# Patient Record
Sex: MALE | Race: WHITE | URBAN - METROPOLITAN AREA
[De-identification: names, ages, dates, MRNs, and addresses within clinical notes are randomized per-mention and may not be internally consistent; named-entity substitution may affect disease eponyms.]

---

## 2018-12-20 LAB
ALBUMIN SERPL-MCNC: 4.2 G/DL
ALP BLD-CCNC: 65 U/L
ALT SERPL-CCNC: 41 U/L
ANION GAP SERPL CALCULATED.3IONS-SCNC: 7 MMOL/L
AST SERPL-CCNC: 22 U/L
AVERAGE GLUCOSE: NORMAL
BASOPHILS ABSOLUTE: 69 /ΜL
BASOPHILS RELATIVE PERCENT: 1.3 %
BILIRUB SERPL-MCNC: 0.9 MG/DL (ref 0.1–1.4)
BUN BLDV-MCNC: 8 MG/DL
CALCIUM SERPL-MCNC: 9.7 MG/DL
CHLORIDE BLD-SCNC: 102 MMOL/L
CHOLESTEROL, TOTAL: 190 MG/DL
CHOLESTEROL/HDL RATIO: NORMAL
CO2: 30 MMOL/L
CREAT SERPL-MCNC: 0.94 MG/DL
EOSINOPHILS ABSOLUTE: 201 /ΜL
EOSINOPHILS RELATIVE PERCENT: 3.8 %
GFR CALCULATED: NORMAL
GLUCOSE BLD-MCNC: 171 MG/DL
HBA1C MFR BLD: 5.5 %
HCT VFR BLD CALC: 44.6 % (ref 41–53)
HDLC SERPL-MCNC: 47 MG/DL (ref 35–70)
HEMOGLOBIN: 15 G/DL (ref 13.5–17.5)
LDL CHOLESTEROL CALCULATED: 105 MG/DL (ref 0–160)
LYMPHOCYTES ABSOLUTE: 1627 /ΜL
LYMPHOCYTES RELATIVE PERCENT: 30.7 %
MCH RBC QN AUTO: 29 PG
MCHC RBC AUTO-ENTMCNC: 33.7 G/DL
MCV RBC AUTO: 85.9 FL
MONOCYTES ABSOLUTE: 276 /ΜL
MONOCYTES RELATIVE PERCENT: 5.2 %
NEUTROPHILS ABSOLUTE: 3127 /ΜL
NEUTROPHILS RELATIVE PERCENT: 59 %
PLATELET # BLD: 158 K/ΜL
PMV BLD AUTO: 9.6 FL
POTASSIUM SERPL-SCNC: 4 MMOL/L
RBC # BLD: 5.19 10^6/ΜL
SODIUM BLD-SCNC: 139 MMOL/L
T4 FREE: 0.76
TOTAL PROTEIN: 6.4
TRIGL SERPL-MCNC: 192 MG/DL
TSH SERPL DL<=0.05 MIU/L-ACNC: 1.54 UIU/ML
VLDLC SERPL CALC-MCNC: NORMAL MG/DL
WBC # BLD: 5.3 10^3/ML

## 2019-06-27 ENCOUNTER — OFFICE VISIT (OUTPATIENT)
Dept: INTERNAL MEDICINE CLINIC | Age: 54
End: 2019-06-27
Payer: MEDICAID

## 2019-06-27 VITALS
OXYGEN SATURATION: 98 % | BODY MASS INDEX: 36.73 KG/M2 | HEIGHT: 71 IN | SYSTOLIC BLOOD PRESSURE: 122 MMHG | WEIGHT: 262.4 LBS | RESPIRATION RATE: 16 BRPM | HEART RATE: 99 BPM | DIASTOLIC BLOOD PRESSURE: 84 MMHG

## 2019-06-27 DIAGNOSIS — Z12.5 PROSTATE CANCER SCREENING: ICD-10-CM

## 2019-06-27 DIAGNOSIS — K21.9 GASTROESOPHAGEAL REFLUX DISEASE WITHOUT ESOPHAGITIS: ICD-10-CM

## 2019-06-27 DIAGNOSIS — Z12.11 COLON CANCER SCREENING: ICD-10-CM

## 2019-06-27 DIAGNOSIS — J30.89 ALLERGIC RHINITIS DUE TO OTHER ALLERGIC TRIGGER, UNSPECIFIED SEASONALITY: ICD-10-CM

## 2019-06-27 DIAGNOSIS — Z23 NEED FOR PNEUMOCOCCAL VACCINATION: ICD-10-CM

## 2019-06-27 DIAGNOSIS — Z11.59 NEED FOR HEPATITIS C SCREENING TEST: ICD-10-CM

## 2019-06-27 DIAGNOSIS — F33.41 RECURRENT MAJOR DEPRESSIVE DISORDER, IN PARTIAL REMISSION (HCC): ICD-10-CM

## 2019-06-27 DIAGNOSIS — Z11.4 ENCOUNTER FOR SCREENING FOR HIV: ICD-10-CM

## 2019-06-27 DIAGNOSIS — B35.4 TINEA CORPORIS: ICD-10-CM

## 2019-06-27 DIAGNOSIS — F10.21 ALCOHOLISM IN REMISSION (HCC): ICD-10-CM

## 2019-06-27 DIAGNOSIS — F41.9 ANXIETY: Primary | ICD-10-CM

## 2019-06-27 PROBLEM — J30.2 SEASONAL ALLERGIES: Status: ACTIVE | Noted: 2019-01-08

## 2019-06-27 PROBLEM — F32.9 MAJOR DEPRESSIVE DISORDER: Status: ACTIVE | Noted: 2018-12-18

## 2019-06-27 PROBLEM — F43.10 POSTTRAUMATIC STRESS DISORDER: Status: ACTIVE | Noted: 2018-12-18

## 2019-06-27 PROCEDURE — 1036F TOBACCO NON-USER: CPT | Performed by: INTERNAL MEDICINE

## 2019-06-27 PROCEDURE — 3017F COLORECTAL CA SCREEN DOC REV: CPT | Performed by: INTERNAL MEDICINE

## 2019-06-27 PROCEDURE — 90732 PPSV23 VACC 2 YRS+ SUBQ/IM: CPT | Performed by: INTERNAL MEDICINE

## 2019-06-27 PROCEDURE — 99204 OFFICE O/P NEW MOD 45 MIN: CPT | Performed by: INTERNAL MEDICINE

## 2019-06-27 PROCEDURE — G8417 CALC BMI ABV UP PARAM F/U: HCPCS | Performed by: INTERNAL MEDICINE

## 2019-06-27 PROCEDURE — G8427 DOCREV CUR MEDS BY ELIG CLIN: HCPCS | Performed by: INTERNAL MEDICINE

## 2019-06-27 PROCEDURE — 90471 IMMUNIZATION ADMIN: CPT | Performed by: INTERNAL MEDICINE

## 2019-06-27 RX ORDER — FLUTICASONE PROPIONATE 50 MCG
2 SPRAY, SUSPENSION (ML) NASAL DAILY
Qty: 1 BOTTLE | Refills: 5 | Status: SHIPPED | OUTPATIENT
Start: 2019-06-27 | End: 2021-02-23

## 2019-06-27 RX ORDER — QUETIAPINE FUMARATE 100 MG/1
TABLET, FILM COATED ORAL
Refills: 2 | COMMUNITY
Start: 2019-06-19 | End: 2019-10-31 | Stop reason: SDUPTHER

## 2019-06-27 RX ORDER — OMEPRAZOLE 20 MG/1
CAPSULE, DELAYED RELEASE ORAL
Refills: 0 | COMMUNITY
Start: 2019-05-16 | End: 2019-06-27 | Stop reason: SDUPTHER

## 2019-06-27 RX ORDER — PRAZOSIN HYDROCHLORIDE 1 MG/1
CAPSULE ORAL
Refills: 2 | COMMUNITY
Start: 2019-06-19 | End: 2019-10-31 | Stop reason: SDUPTHER

## 2019-06-27 RX ORDER — OMEPRAZOLE 20 MG/1
20 CAPSULE, DELAYED RELEASE ORAL 2 TIMES DAILY
Qty: 60 CAPSULE | Refills: 5 | Status: SHIPPED | OUTPATIENT
Start: 2019-06-27 | End: 2019-10-31 | Stop reason: SDUPTHER

## 2019-06-27 RX ORDER — LEVOCETIRIZINE DIHYDROCHLORIDE 5 MG/1
5 TABLET, FILM COATED ORAL NIGHTLY
Qty: 30 TABLET | Refills: 5 | Status: SHIPPED | OUTPATIENT
Start: 2019-06-27 | End: 2021-02-23

## 2019-06-27 SDOH — HEALTH STABILITY: MENTAL HEALTH: HOW OFTEN DO YOU HAVE A DRINK CONTAINING ALCOHOL?: NEVER

## 2019-06-27 ASSESSMENT — PATIENT HEALTH QUESTIONNAIRE - PHQ9
SUM OF ALL RESPONSES TO PHQ QUESTIONS 1-9: 2
1. LITTLE INTEREST OR PLEASURE IN DOING THINGS: 1
SUM OF ALL RESPONSES TO PHQ9 QUESTIONS 1 & 2: 2
SUM OF ALL RESPONSES TO PHQ QUESTIONS 1-9: 2
2. FEELING DOWN, DEPRESSED OR HOPELESS: 1

## 2019-06-27 ASSESSMENT — ENCOUNTER SYMPTOMS
ABDOMINAL PAIN: 0
EYE PAIN: 0
WHEEZING: 0
BACK PAIN: 0
COLOR CHANGE: 0
CONSTIPATION: 0
VOMITING: 0
SINUS PAIN: 0
SINUS PRESSURE: 0
DIARRHEA: 0
COUGH: 0
NAUSEA: 0
PHOTOPHOBIA: 0
RHINORRHEA: 1
SHORTNESS OF BREATH: 0
TROUBLE SWALLOWING: 0

## 2019-06-27 NOTE — PROGRESS NOTES
2019    Marquis Dulce Cohen (:  1965) is a 52 y.o. male, here for evaluation of the following medical concerns:    HPI     Has depression, anxiety and PTSD  Wants a psychiatrist and a psychologist  Currently being seen at [facility] but wants to move on from there  On sertraline 150mg daily, seroquel 100mg HS and prazosin 1mg hs  Taking metformin - Dr. Danica Jimenez - to counteract effects of seroquel. These medications are doing okay. Still with significant anxiety and some agoraphobia. He is seeing a  for counseling. He is in recovery from alcoholism. No EtOH in last 6 years. GERD - omeprazole - well controlled on 20mg BID    Itchy rash on elbow for last few months. Has been there in the past and gone away. Now back. Takes generic claritin 2 x a day. This helps some. Eual Lack does nothing - not helpful at all  Has used zyrtec as well  Still with nasal allergy symptoms and itchy watery eyes. Has used nasal sprays in the past  Eye discharge    Thirsty all the time - last DM screen was normal. Sodium is normal also. Dad has both knees and hips replaced. He has had a left TKR. No other joints bother him. Review of Systems   Constitutional: Negative for appetite change, fatigue, fever and unexpected weight change. No night sweats   HENT: Positive for postnasal drip and rhinorrhea. Negative for congestion, ear pain, hearing loss, nosebleeds, sinus pressure, sinus pain, sneezing, tinnitus and trouble swallowing. Eyes: Negative for photophobia, pain and visual disturbance. Respiratory: Negative for cough, shortness of breath and wheezing. Cardiovascular: Negative for chest pain, palpitations and leg swelling. Gastrointestinal: Negative for abdominal pain, constipation, diarrhea, nausea and vomiting. Endocrine: Negative for cold intolerance, heat intolerance, polydipsia, polyphagia and polyuria.         +dry mouth   Genitourinary: Negative for difficulty urinating, dysuria, frequency, hematuria and urgency. Musculoskeletal: Positive for arthralgias. Negative for back pain, joint swelling, myalgias and neck pain. Skin: Negative for color change and rash. Allergic/Immunologic: Negative for environmental allergies, food allergies and immunocompromised state. Neurological: Negative for dizziness, syncope, speech difficulty, weakness, light-headedness, numbness and headaches. Hematological: Negative for adenopathy. Does not bruise/bleed easily. Psychiatric/Behavioral: Negative for dysphoric mood and sleep disturbance. The patient is nervous/anxious. Prior to Visit Medications    Medication Sig Taking?  Authorizing Provider   QUEtiapine (SEROQUEL) 100 MG tablet 1 tablet nightly Yes Historical Provider, MD   prazosin (MINIPRESS) 1 MG capsule one tablet daily Yes Historical Provider, MD   metFORMIN (GLUCOPHAGE) 500 MG tablet 1 tablet daily Yes Historical Provider, MD   sertraline (ZOLOFT) 50 MG tablet TK 1 AND 1/2 TS PO D Yes Historical Provider, MD   omeprazole (PRILOSEC) 20 MG delayed release capsule BID Yes Historical Provider, MD   Loratadine (CLARITIN PO) Take by mouth Yes Historical Provider, MD        No Known Allergies    Past Medical History:   Diagnosis Date    Anxiety     Arthritis     Depression        Past Surgical History:   Procedure Laterality Date    TONSILLECTOMY  1983    TOTAL KNEE ARTHROPLASTY Left        Social History     Socioeconomic History    Marital status: Single     Spouse name: Not on file    Number of children: Not on file    Years of education: Not on file    Highest education level: Not on file   Occupational History    Not on file   Social Needs    Financial resource strain: Not on file    Food insecurity:     Worry: Not on file     Inability: Not on file    Transportation needs:     Medical: Not on file     Non-medical: Not on file   Tobacco Use    Smoking status: Never Smoker    Smokeless tobacco: Never Neck supple. No JVD present. No thyromegaly present. No carotid bruits   Cardiovascular: Normal rate, regular rhythm, normal heart sounds and intact distal pulses. Exam reveals no gallop and no friction rub. No murmur heard. Pulmonary/Chest: Effort normal and breath sounds normal. He has no wheezes. He has no rales. Abdominal: Soft. Bowel sounds are normal.   Musculoskeletal: Normal range of motion. He exhibits no edema, tenderness or deformity. Lymphadenopathy:     He has no cervical adenopathy. Neurological: He is alert and oriented to person, place, and time. Coordination normal.   Skin: Skin is warm and dry. No rash noted. He is not diaphoretic. Circular lesion with heaped up edges and central clearing with some mild scale on right elbow. Some unroofed papules at edges   Psychiatric: He has a normal mood and affect. His behavior is normal.       ASSESSMENT/PLAN:  1. Anxiety  4. Recurrent major depressive disorder, in partial remission ([address]  10. Alcoholism in remission [facility])  Continue current meds. Given resources for community psychiatry and psychology. - Pneumococcal polysaccharide vaccine 23-valent greater than or equal to 3yo subcutaneous/IM  - Vitamin D 25 Hydroxy; Future  - B12 levels    2. Tinea corporis  Presumed on elbow. If no improvement with treatment for tinea will rx steroids for presumed annular eczema. - terbinafine (LAMISIL) 1 % cream; Apply topically 2 times daily to affected area for at least 2 weeks  Dispense: 24 g; Refill: 0    3. Gastroesophageal reflux disease without esophagitis  Symptoms well controlled on high dose PPI. He will try some ranitidine instead OTC to try to get off of medication. Continue to monitor. Consider EGD in future if control becomes worse. - Magnesium; Future  - Vitamin B12; Future  - omeprazole (PRILOSEC) 20 MG delayed release capsule; Take 1 capsule by mouth 2 times daily  Dispense: 60 capsule; Refill: 5    5.  Encounter for screening for HIV  - HIV-1 AND HIV-2 ANTIBODIES; Future    6. Prostate cancer screening  After discussion of risks and benefits of screening we have chosen to proceed with PSA screening.   - Psa screening; Future    7. Colon cancer screening  - Marty Azar MD, Gastroenterology, [facility]    8. Need for hepatitis C screening test  - Hepatitis C Antibody; Future    9. Need for pneumococcal vaccination  - Pneumococcal polysaccharide vaccine 23-valent greater than or equal to 3yo subcutaneous/IM    11. Allergic rhinitis due to other allergic trigger, unspecified seasonality  Has failed multiple other H2 blockers including high dose claritin, zyrtec and allegra. Will trial xyzal and add flonase.   - levocetirizine (XYZAL) 5 MG tablet; Take 1 tablet by mouth nightly  Dispense: 30 tablet; Refill: 5  - fluticasone (FLONASE) 50 MCG/ACT nasal spray; 2 sprays by Each Nostril route daily  Dispense: 1 Bottle; Refill: 5      Return in about 6 months (around 12/27/2019) for Physical.    An  electronic signature was used to authenticate this note.     --Miguel Diaz MD on 6/27/2019 at 8:24 AM

## 2019-06-28 RX ORDER — ERGOCALCIFEROL 1.25 MG/1
50000 CAPSULE ORAL WEEKLY
Qty: 12 CAPSULE | Refills: 0 | Status: SHIPPED | OUTPATIENT
Start: 2019-06-28 | End: 2019-09-25 | Stop reason: SDUPTHER

## 2019-09-25 RX ORDER — ERGOCALCIFEROL 1.25 MG/1
CAPSULE ORAL
Qty: 12 CAPSULE | Refills: 0 | Status: SHIPPED | OUTPATIENT
Start: 2019-09-25 | End: 2021-02-23

## 2019-10-30 ENCOUNTER — PATIENT MESSAGE (OUTPATIENT)
Dept: INTERNAL MEDICINE CLINIC | Age: 54
End: 2019-10-30

## 2019-10-30 DIAGNOSIS — K21.9 GASTROESOPHAGEAL REFLUX DISEASE WITHOUT ESOPHAGITIS: ICD-10-CM

## 2019-10-31 RX ORDER — OMEPRAZOLE 20 MG/1
20 CAPSULE, DELAYED RELEASE ORAL 2 TIMES DAILY
Qty: 60 CAPSULE | Refills: 5 | Status: SHIPPED | OUTPATIENT
Start: 2019-10-31 | End: 2020-09-01 | Stop reason: SDUPTHER

## 2019-10-31 RX ORDER — PRAZOSIN HYDROCHLORIDE 1 MG/1
3 CAPSULE ORAL NIGHTLY
Qty: 90 CAPSULE | Refills: 2 | Status: SHIPPED | OUTPATIENT
Start: 2019-10-31 | End: 2020-09-01 | Stop reason: SDUPTHER

## 2019-10-31 RX ORDER — SERTRALINE HYDROCHLORIDE 100 MG/1
150 TABLET, FILM COATED ORAL DAILY
Qty: 45 TABLET | Refills: 2 | Status: SHIPPED | OUTPATIENT
Start: 2019-10-31 | End: 2020-09-01 | Stop reason: SDUPTHER

## 2019-10-31 RX ORDER — QUETIAPINE FUMARATE 300 MG/1
300 TABLET, FILM COATED ORAL DAILY
Qty: 30 TABLET | Refills: 2 | Status: SHIPPED | OUTPATIENT
Start: 2019-10-31 | End: 2020-01-28

## 2020-01-28 RX ORDER — QUETIAPINE FUMARATE 300 MG/1
300 TABLET, FILM COATED ORAL DAILY
Qty: 30 TABLET | Refills: 0 | Status: SHIPPED | OUTPATIENT
Start: 2020-01-28 | End: 2020-07-13

## 2020-01-28 NOTE — TELEPHONE ENCOUNTER
Last appointment: 6/27/2019  Next appointment: Visit date not found  Last refill: 10/31/19      Sent [employer] message to pt to remind due for follow up

## 2020-02-03 RX ORDER — SERTRALINE HYDROCHLORIDE 100 MG/1
TABLET, FILM COATED ORAL
Qty: 45 TABLET | Refills: 2 | OUTPATIENT
Start: 2020-02-03

## 2020-02-03 RX ORDER — PRAZOSIN HYDROCHLORIDE 1 MG/1
CAPSULE ORAL
Qty: 90 CAPSULE | Refills: 2 | OUTPATIENT
Start: 2020-02-03

## 2020-07-13 RX ORDER — QUETIAPINE FUMARATE 300 MG/1
300 TABLET, FILM COATED ORAL DAILY
Qty: 15 TABLET | Refills: 0 | Status: SHIPPED | OUTPATIENT
Start: 2020-07-13 | End: 2020-08-03

## 2020-08-03 RX ORDER — QUETIAPINE FUMARATE 300 MG/1
TABLET, FILM COATED ORAL
Qty: 15 TABLET | Refills: 0 | Status: SHIPPED | OUTPATIENT
Start: 2020-08-03 | End: 2020-09-01 | Stop reason: SDUPTHER

## 2020-08-17 NOTE — TELEPHONE ENCOUNTER
Last appointment: 6/9/2020  Next appointment: Visit date not found  Last refill: 7/13/20      30 day pending and mychart message sent to pt to schedule

## 2020-09-01 ENCOUNTER — TELEPHONE (OUTPATIENT)
Dept: INTERNAL MEDICINE CLINIC | Age: 55
End: 2020-09-01

## 2020-09-01 ENCOUNTER — PATIENT MESSAGE (OUTPATIENT)
Dept: INTERNAL MEDICINE CLINIC | Age: 55
End: 2020-09-01

## 2020-09-01 RX ORDER — PRAZOSIN HYDROCHLORIDE 1 MG/1
3 CAPSULE ORAL NIGHTLY
Qty: 90 CAPSULE | Refills: 0 | Status: SHIPPED | OUTPATIENT
Start: 2020-09-01 | End: 2020-09-28

## 2020-09-01 RX ORDER — OMEPRAZOLE 20 MG/1
20 CAPSULE, DELAYED RELEASE ORAL 2 TIMES DAILY
Qty: 60 CAPSULE | Refills: 0 | Status: SHIPPED | OUTPATIENT
Start: 2020-09-01 | End: 2020-09-28

## 2020-09-01 RX ORDER — QUETIAPINE FUMARATE 300 MG/1
TABLET, FILM COATED ORAL
Qty: 30 TABLET | Refills: 0 | Status: SHIPPED | OUTPATIENT
Start: 2020-09-01 | End: 2020-09-15

## 2020-09-01 RX ORDER — SERTRALINE HYDROCHLORIDE 100 MG/1
150 TABLET, FILM COATED ORAL DAILY
Qty: 45 TABLET | Refills: 0 | Status: SHIPPED | OUTPATIENT
Start: 2020-09-01 | End: 2020-09-28 | Stop reason: SDUPTHER

## 2020-09-01 NOTE — TELEPHONE ENCOUNTER
----- Message from [facility] sent at 9/1/2020 11:45 AM EDT -----  Subject: Appointment Request    Reason for Call: Urgent (Patient Request) Existing Condition Follow    QUESTIONS  Type of Appointment? Established Patient  Reason for appointment request? Other - Pt would like to be seen sooner   than 14th  Additional Information for Provider? Pt. requested that if any earlier   appt dates open up before 9/14 if they could come in before then.   ---------------------------------------------------------------------------  --------------  CALL BACK INFO  What is the best way for the office to contact you? OK to leave message on   voicemail  Preferred Call Back Phone Number? [phone]  ---------------------------------------------------------------------------  --------------  SCRIPT ANSWERS  Relationship to Patient? Self  Appointment reason? Well Care/Follow Ups  Select a Well Care/Follow Ups appointment reason? Adult Existing Condition   Follow Up [Diabetes   CHF   COPD   Hypertension/Blood Pressure Check]  (Patient requests to see provider urgently. )? No  (Is the patient requesting to be seen urgently for their symptoms?)? Yes  Is this follow up request related to routine Diabetes Management? No  Are you having any new concerns about your existing condition? No  Have you been diagnosed with   tested for   or told that you are suspected of having COVID-19 (Coronavirus)? No  Have you had a fever or taken medication to treat a fever within the past   3 days? No  Have you had a cough   shortness of breath or flu-like symptoms within the past 3 days? No  Do you currently have flu-like symptoms including fever or chills   cough   shortness of breath   or difficulty breathing   or new loss of taste or smell? No  (Service Expert  click yes below to proceed with [employer] As Usual   Scheduling)?  Yes

## 2020-09-01 NOTE — TELEPHONE ENCOUNTER
From: Scott Cohen  To: MALISSA Sawant  Sent: 9/1/2020 11:48 AM EDT  Subject: RE:johnathan Munroe,  I made an appointment for 9/14 for a teleconference. I need to talk to Dr Peace Sánchez about my medications for PTSD. I was going to the [facility], but that is not working anymore. This is the list of meds I am taking that I need to be renewed if the DrRadha would like to review before the appointment or perhaps call in the prescriptions before the appointment if she would feel comfortable with that. Thanks. Celestino Cohen  Meds:  Sertaline - 200 mg / day  Quetiapine - 400 mg / day  Metaformine - 850 mg / day  Prazosin - 3 mg / day  Omeprazole - 2, 20 mg pills / day      ----- Message -----   From:MALISSA KIRBY   Sent:8/17/2020 8:43 AM EDT   To:Audi Cohen   Subject:appointments    Good Morning Faby Hooks all is well and you have stayed healthy during this trying time! We received a refill request from your pharmacy but we haven't seen you in over a year  I know there was some issues with insurance. Please contact the office at your earliest  Convenience to schedule a follow up. We will send in a 30 day supply to get you to your next appointment. We look forward to seeing you soon.

## 2020-09-01 NOTE — TELEPHONE ENCOUNTER
Last appointment: 6/9/2020  Next appointment: 9/14/2020    30 day supply pending has virtual set up.

## 2020-09-15 RX ORDER — QUETIAPINE FUMARATE 300 MG/1
TABLET, FILM COATED ORAL
Qty: 60 TABLET | Refills: 0 | Status: SHIPPED | OUTPATIENT
Start: 2020-09-15 | End: 2020-10-20

## 2020-09-16 ENCOUNTER — PATIENT MESSAGE (OUTPATIENT)
Dept: INTERNAL MEDICINE CLINIC | Age: 55
End: 2020-09-16

## 2020-09-16 ENCOUNTER — TELEMEDICINE (OUTPATIENT)
Dept: INTERNAL MEDICINE CLINIC | Age: 55
End: 2020-09-16
Payer: MEDICAID

## 2020-09-16 PROCEDURE — 3017F COLORECTAL CA SCREEN DOC REV: CPT | Performed by: INTERNAL MEDICINE

## 2020-09-16 PROCEDURE — 99214 OFFICE O/P EST MOD 30 MIN: CPT | Performed by: INTERNAL MEDICINE

## 2020-09-16 PROCEDURE — G8427 DOCREV CUR MEDS BY ELIG CLIN: HCPCS | Performed by: INTERNAL MEDICINE

## 2020-09-16 NOTE — PROGRESS NOTES
2020    TELEHEALTH EVALUATION -- Audio/Visual (During RLVWD-51 public health emergency)  Essie Mcnamara MD  [facility]    HPI:    Miladis Sykes (:  1965) has requested an audio/video evaluation for the following concern(s):    Have not seen in over a year. Here for follow up on mental health medications, etc  Was being seen at [facility]  Was in process of finding a psychiatrist when lost his medicaid and has not found one since. Medications have been titrated since I last saw him. He is currently on seroquel 300mg BID, sertraline 150mg daily, prazosin 3mg at night  Doses stable since end of October of last year. Currently -   Going on medicare in October. Has had trouble finding a psychiatrist because of insurance issues. Meds in general okay. Generally sleeping pretty well. Occasional trouble. Needs right knee replacement. Weight has been increasing. Does not have a scale. Review of Systems   Constitutional: Negative for chills, fatigue and fever. Eyes: Negative for visual disturbance. Respiratory: Negative for cough, shortness of breath and wheezing. Cardiovascular: Negative for chest pain, palpitations and leg swelling. Gastrointestinal: Negative for abdominal distention, nausea and vomiting. Musculoskeletal: Positive for arthralgias. Psychiatric/Behavioral: Negative for decreased concentration, dysphoric mood, sleep disturbance and suicidal ideas. The patient is not nervous/anxious. Prior to Visit Medications    Medication Sig Taking?  Authorizing Provider   QUEtiapine (SEROQUEL) 300 MG tablet TAKE 1 TABLET BY MOUTH TWICE DAILY  Georgia Guerrero MD   sertraline (ZOLOFT) 100 MG tablet Take 1.5 tablets by mouth daily  Georgia Guerrero MD   prazosin (MINIPRESS) 1 MG capsule Take 3 capsules by mouth nightly  Georgia Guerrero MD   omeprazole (PRILOSEC) 20 MG delayed release capsule Take 1 capsule by mouth 2 times daily  Georgia Guerrero MD metFORMIN (GLUCOPHAGE) 850 MG tablet TAKE 1 TABLET BY MOUTH DAILY WITH BREAKFAST  Mac Good MD   vitamin D (ERGOCALCIFEROL) 61764 units CAPS capsule TAKE ONE CAPSULE BY MOUTH ONCE WEEKLY FOLLOWED BY OTC 1000IU DAILY  Mac Good MD   Loratadine (CLARITIN PO) Take by mouth  Historical Provider, MD   terbinafine (LAMISIL) 1 % cream Apply topically 2 times daily to affected area for at least 2 weeks  Mac Good MD   levocetirizine (XYZAL) 5 MG tablet Take 1 tablet by mouth nightly  Mac Good MD   fluticasone (FLONASE) 50 MCG/ACT nasal spray 2 sprays by Each Nostril route daily  Mac Good MD       Social History     Tobacco Use    Smoking status: Never Smoker    Smokeless tobacco: Never Used   Substance Use Topics    Alcohol use: Not Currently     Frequency: Never     Comment: alcoholic in remission    Drug use: Never        Past Medical History:   Diagnosis Date    Anxiety     Arthritis     Depression    ,   Past Surgical History:   Procedure Laterality Date    TONSILLECTOMY  1983    TOTAL KNEE ARTHROPLASTY Left    ,   Social History     Tobacco Use    Smoking status: Never Smoker    Smokeless tobacco: Never Used   Substance Use Topics    Alcohol use: Not Currently     Frequency: Never     Comment: alcoholic in remission    Drug use: Never   ,   Family History   Problem Relation Age of Onset    Alcohol Abuse Father     Heart Disease Father     Arthritis Father     Colon Cancer Neg Hx     Breast Cancer Neg Hx     Prostate Cancer Neg Hx        PHYSICAL EXAMINATION:    General - well developed, well nourished, NAD  Mental status - Awake and alert, Oriented x 3. Follows commands. Eyes - EOMI, Sclera normal, No conjunctival discharge or injection  HENT: NCAT, MMM. Normal external ears  Neck - no visualized masses, nl ROM  Pulmonary/Chest - Speaking in full sentences, effort normal, no distress. MSK - Normal gait with no signs of ataxia.   Neuro - No facial asymmetry, no gaze palzy  Skin - no rashes or discoloration of facial skin  Psych - nl appearance and grooming, affect appropriate to mood, no hallucinations  Other pertinent observable physical exam findings-       ASSESSMENT/PLAN:  1. Recurrent major depressive disorder, in partial remission ([facility] [address]  Things are stable. ?bipolar? . Will get in with psych provider once she starts. Schedule with [facility]. Complicated by alcohol use in remission. Continue metformin for metabolic side effects of seroquel. Work on diet and exercise as able (exercise difficult because of knees)  - Comprehensive Metabolic Panel; Future  - CBC Auto Differential; Future  - TSH with Reflex; Future    2. Vitamin D deficiency  - Vitamin D 25 Hydroxy; Future    3. Prostate cancer screening  - Psa screening; Future    4. Prediabetes  - Hemoglobin A1C; Future    5. Encounter for lipid screening for cardiovascular disease  - Lipid Panel; Future    6. Alcoholism in remission ([facility]  Stable. 7. Anxiety  See above. 8. Posttraumatic stress disorder  [facility]. [facility]. May need community therapy. Cologualisa once on medicare    Return in about 6 months (around 3/16/2021) for Physical.    Cheryl Turpin is a 62 y.o. male being evaluated by a Virtual Visit (video visit) encounter to address concerns as mentioned above. A caregiver was present when appropriate. Due to this being a TeleHealth encounter (During BKJVI-58 public health emergency), evaluation of the following organ systems was limited: Vitals/Constitutional/EENT/Resp/CV/GI//MS/Neuro/Skin/Heme-Lymph-Imm. Pursuant to the emergency declaration under the [facility], [facility] authority and the [employer] Act, this Virtual Visit was conducted with patient's (and/or legal guardian's) consent, to reduce the patient's risk of exposure to COVID-19 and provide necessary medical care.   The patient (and/or legal guardian) has also been advised to contact this office for worsening conditions or problems, and seek emergency medical treatment and/or call 911 if deemed necessary. Patient identification was verified at the start of the visit: YES    Total time spent on this encounter: 25 mins   25 minutes spent with patient- >50 % continuity of care and/or counseling regarding mental health resources. Services were provided through a video synchronous discussion virtually to substitute for in-person clinic visit. Patient and provider were located at their individual homes. --Zhao Aguirre MD on 9/16/2020 at 11:32 AM    An electronic signature was used to authenticate this note.

## 2020-09-18 ENCOUNTER — VIRTUAL VISIT (OUTPATIENT)
Dept: PSYCHOLOGY | Age: 55
End: 2020-09-18
Payer: MEDICAID

## 2020-09-18 PROCEDURE — 90791 PSYCH DIAGNOSTIC EVALUATION: CPT | Performed by: PSYCHOLOGIST

## 2020-09-18 ASSESSMENT — ANXIETY QUESTIONNAIRES
1. FEELING NERVOUS, ANXIOUS, OR ON EDGE: 2-OVER HALF THE DAYS
3. WORRYING TOO MUCH ABOUT DIFFERENT THINGS: 2-OVER HALF THE DAYS
2. NOT BEING ABLE TO STOP OR CONTROL WORRYING: 2-OVER HALF THE DAYS
4. TROUBLE RELAXING: 2-OVER HALF THE DAYS
5. BEING SO RESTLESS THAT IT IS HARD TO SIT STILL: 0-NOT AT ALL
7. FEELING AFRAID AS IF SOMETHING AWFUL MIGHT HAPPEN: 1-SEVERAL DAYS
GAD7 TOTAL SCORE: 10
6. BECOMING EASILY ANNOYED OR IRRITABLE: 1-SEVERAL DAYS

## 2020-09-18 ASSESSMENT — PATIENT HEALTH QUESTIONNAIRE - PHQ9
5. POOR APPETITE OR OVEREATING: 3
6. FEELING BAD ABOUT YOURSELF - OR THAT YOU ARE A FAILURE OR HAVE LET YOURSELF OR YOUR FAMILY DOWN: 3
4. FEELING TIRED OR HAVING LITTLE ENERGY: 2
3. TROUBLE FALLING OR STAYING ASLEEP: 1
8. MOVING OR SPEAKING SO SLOWLY THAT OTHER PEOPLE COULD HAVE NOTICED. OR THE OPPOSITE, BEING SO FIGETY OR RESTLESS THAT YOU HAVE BEEN MOVING AROUND A LOT MORE THAN USUAL: 0
SUM OF ALL RESPONSES TO PHQ9 QUESTIONS 1 & 2: 3
2. FEELING DOWN, DEPRESSED OR HOPELESS: 1
SUM OF ALL RESPONSES TO PHQ QUESTIONS 1-9: 15
9. THOUGHTS THAT YOU WOULD BE BETTER OFF DEAD, OR OF HURTING YOURSELF: 0
7. TROUBLE CONCENTRATING ON THINGS, SUCH AS READING THE NEWSPAPER OR WATCHING TELEVISION: 3
SUM OF ALL RESPONSES TO PHQ QUESTIONS 1-9: 15
1. LITTLE INTEREST OR PLEASURE IN DOING THINGS: 2

## 2020-09-18 NOTE — PATIENT INSTRUCTIONS
Therapy resources for trauma-focused therapy:    [facility] [address], Yossi Bermudez [address], [address]  [phone]   [email or web address]  Accepts Rolly Ching, Evangelist, [place] aaron, [address], [address], [address], [employer], [employer]    [facility]  [place] or [address] [facility] pay (offers discounted rates if needed)  www.[email or web address]. THE EMPTY JOINT    Manny Cotto  Licensed counselor  [phone]  www.[email or web address]. THE EMPTY JOINT    MIRIAN Andrews  Specializes in trauma  [facility]  Private pay  [place]  [email or web address] EMPTY JOINT    Raymond Cabrera  Specializes in childhood trauma  Uses talk therapy, EMDR, and expressive art in the [facility]   Private pay  Www.[employer]. THE EMPTY JOINT/us/[email or web address]    Tanvir Green  Takes most insurance, just not [place] [employer], [employer] or Medicaid/Medicare   Level 2 Sensorimotor trained, which is a gentle, long term way of working with trauma. [facility] (in [place] [address] [facility] [facility])  [phone]    LenaLee Herman 54  Uses EMDR for trauma  [facility], [address] [place] [address] most insurance  [email or web address]    [facility]  Chris Green (specializes in trauma-focused work)  [employer] [facility], [employer], [employer], [employer]  [phone]  [facility]  [phone]  [email or web address].THE EMPTY JOINT    Kamilla Falcon  Licensed counselor  Focus on relationship counseling, does trauma work as well  www.[employer]    Jacqueline Redd, LPCC-S  Focus on grief/loss, couples, [employer] area  www.[email or web address]. THE EMPTY JOINT    Sharmin Archibald  Trauma focus treatment using EMDR and expressive arts therapy   Terrie Cheney at [place] location  [phone] (her number, not the practice) Can look her up at   [address] com
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
## 2020-09-21 ENCOUNTER — TELEPHONE (OUTPATIENT)
Dept: INTERNAL MEDICINE CLINIC | Age: 55
End: 2020-09-21

## 2020-09-21 ENCOUNTER — PATIENT MESSAGE (OUTPATIENT)
Dept: INTERNAL MEDICINE CLINIC | Age: 55
End: 2020-09-21

## 2020-09-21 NOTE — TELEPHONE ENCOUNTER
I received this mychart on 9/14/20 the day of his appt at 6:32 am. It came directly to me (not sure how) but I don't think this should be a no show since he did reach out. Are we able to change this?

## 2020-09-21 NOTE — TELEPHONE ENCOUNTER
From: Vernell Cohen  To: MALISSA Wright  Sent: 9/14/2020 6:32 AM EDT  Subject: RE:appointments    Hello,    I have been having trouble sleeping and have been up all night. I was going to try and stay up until my 10:30 am appointment, but I don't think I can make it. This happens whenever I have an appt., I can't sleep. I'm so sorry, but If we could make an afternoon appt. , that would be better. Kathya Lainez      ----- Message -----   From:MALISSA KIRBY   EFPF:2/4/9634 2:09 PM EDT   To:Audi Cohen   Subject:RE:appointments    Mohan Montiel,  Received your message about wanting to be seen sooner then the 14th. Unfortunately that is my first available at this time. I will keep an eye out for a cancellation but you are currently scheduled for the first available. Thank you  Forrest Gore       ----- Message -----    From:Audi Cohen   Sent:9/1/2020 12:01 PM EDT   To:MALISSA KIRBY   Subject:RE:appointments    Hello,  They are not new. I have been taking them for a while, though the doses have changed. I will run out of meds soon. If you would call them in now, that would be great. I have been taking half doses of sertaline. I am having a lot of trouble following through with anything, so not on top of getting these prescriptions taken care of. Mohan Montiel      ----- Message -----   From:MALISSA KIRBY   Sent:9/1/2020 11:52 AM EDT   To:Audi Cohen   Subject:RE:johnathan Montiel,  I just want to clarify those are the new medications you are taking? I just want to make sure I enter everything correctly. Are you out of these medications? Or will you run out before your appointment?      ----- Message -----   From:Audi Cohen   Sent:9/1/2020 11:48 AM EDT   To:MAILSSA Wright   Subject:RE:appointments    Pieter Gore,  I made an appointment for 9/14 for a teleconference. I need to talk to Dr Ryan Going about my medications for PTSD. I was going to the [facility], but that is not working anymore.  This is the list of meds I am taking that I need to be renewed if the  would like to review before the appointment or perhaps call in the prescriptions before the appointment if she would feel comfortable with that. Thanks. Nancy Cohen  Meds:  Sertaline - 200 mg / day  Quetiapine - 400 mg / day  Metaformine - 850 mg / day  Prazosin - 3 mg / day  Omeprazole - 2, 20 mg pills / day      ----- Message -----   From:MALISSA KIRBY   Sent:8/17/2020 8:43 AM EDT   To:Audi Cohen   Subject:appointments    Good Morning Judyth Rubinstein all is well and you have stayed healthy during this trying time! We received a refill request from your pharmacy but we haven't seen you in over a year  I know there was some issues with insurance. Please contact the office at your earliest  Convenience to schedule a follow up. We will send in a 30 day supply to get you to your next appointment. We look forward to seeing you soon.

## 2020-09-21 NOTE — TELEPHONE ENCOUNTER
----- Message from Rosemarie Walker MD sent at 9/21/2020  1:18 PM EDT -----  Regarding: RE: NO SHOWS  No, thank you for checking though.   ----- Message -----  From: Naina Johns MA  Sent: 9/21/2020   9:21 AM EDT  To: Rosemarie Walker MD  Subject: FW: NO SHOWS                                     Please see below. 9/14 was also a late cancellation. Do you to dismiss pt?  ----- Message -----  From: Soledad Ada  Sent: 9/18/2020   2:46 PM EDT  To: Naina Johns MA  Subject: NO SHOWS                                         This patient of Dr. Kirsten Paget has had 3 No Show's within a year.  1/7/206/9/20 (late cancel)9/14/20

## 2020-09-24 ASSESSMENT — ENCOUNTER SYMPTOMS
NAUSEA: 0
VOMITING: 0
COUGH: 0
ABDOMINAL DISTENTION: 0
WHEEZING: 0
SHORTNESS OF BREATH: 0

## 2020-09-28 RX ORDER — SERTRALINE HYDROCHLORIDE 100 MG/1
200 TABLET, FILM COATED ORAL DAILY
Qty: 60 TABLET | Refills: 5 | Status: SHIPPED | OUTPATIENT
Start: 2020-09-28 | End: 2021-02-23 | Stop reason: SDUPTHER

## 2020-09-28 NOTE — TELEPHONE ENCOUNTER
From: Maria L Jordan MD  To: Yas Cohen  Sent: 9/16/2020 12:08 PM EDT  Subject: Psych and lab    Psych NP - Mohamud Ariza - likely starting in November  Psychologist - Dr. Rivas Public   [address] [place] [address], [facility] [address]   M-F [id number];  [facility]   [phone]

## 2020-12-29 RX ORDER — OMEPRAZOLE 20 MG/1
CAPSULE, DELAYED RELEASE ORAL
Qty: 60 CAPSULE | Refills: 2 | Status: SHIPPED | OUTPATIENT
Start: 2020-12-29 | End: 2021-04-02

## 2021-02-01 ENCOUNTER — TELEPHONE (OUTPATIENT)
Dept: INTERNAL MEDICINE CLINIC | Age: 56
End: 2021-02-01

## 2021-02-01 RX ORDER — QUETIAPINE FUMARATE 300 MG/1
300 TABLET, FILM COATED ORAL 2 TIMES DAILY
Qty: 60 TABLET | Refills: 2 | Status: SHIPPED | OUTPATIENT
Start: 2021-02-01 | End: 2021-02-23

## 2021-02-01 NOTE — TELEPHONE ENCOUNTER
----- Message from Dmitriy Bridgette sent at 2/1/2021  5:00 PM EST -----  Subject: Message to Provider    QUESTIONS  Information for Provider? Patient would like to make an appointment with   the psychiatric nurse. Office is closed now.   ---------------------------------------------------------------------------  --------------  [address]  What is the best way for the office to contact you? OK to leave message on   voicemail  Preferred Call Back Phone Number? [phone]  ---------------------------------------------------------------------------  --------------  SCRIPT ANSWERS  Relationship to Patient?  Self
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
## 2021-02-23 ENCOUNTER — TELEMEDICINE (OUTPATIENT)
Dept: PSYCHIATRY | Age: 56
End: 2021-02-23
Payer: MEDICARE

## 2021-02-23 DIAGNOSIS — F32.A DEPRESSIVE DISORDER: Primary | ICD-10-CM

## 2021-02-23 DIAGNOSIS — F43.10 PTSD (POST-TRAUMATIC STRESS DISORDER): ICD-10-CM

## 2021-02-23 PROCEDURE — 99214 OFFICE O/P EST MOD 30 MIN: CPT | Performed by: NURSE PRACTITIONER

## 2021-02-23 RX ORDER — PRAZOSIN HYDROCHLORIDE 1 MG/1
3 CAPSULE ORAL NIGHTLY
Qty: 90 CAPSULE | Refills: 1 | Status: SHIPPED | OUTPATIENT
Start: 2021-02-23 | End: 2021-07-01 | Stop reason: SDUPTHER

## 2021-02-23 RX ORDER — SERTRALINE HYDROCHLORIDE 100 MG/1
200 TABLET, FILM COATED ORAL DAILY
Qty: 60 TABLET | Refills: 1 | Status: SHIPPED | OUTPATIENT
Start: 2021-02-23 | End: 2021-04-28

## 2021-02-23 RX ORDER — QUETIAPINE FUMARATE 300 MG/1
300 TABLET, FILM COATED ORAL NIGHTLY
Qty: 30 TABLET | Refills: 1 | Status: SHIPPED | OUTPATIENT
Start: 2021-02-23 | End: 2021-04-28

## 2021-02-23 RX ORDER — BUPROPION HYDROCHLORIDE 150 MG/1
150 TABLET ORAL EVERY MORNING
Qty: 30 TABLET | Refills: 1 | Status: SHIPPED | OUTPATIENT
Start: 2021-02-23 | End: 2021-04-28

## 2021-02-23 ASSESSMENT — ENCOUNTER SYMPTOMS
CHEST TIGHTNESS: 0
ABDOMINAL PAIN: 0
SHORTNESS OF BREATH: 0
NAUSEA: 0
VOMITING: 0
DIARRHEA: 0
SORE THROAT: 0

## 2021-04-01 DIAGNOSIS — K21.9 GASTROESOPHAGEAL REFLUX DISEASE WITHOUT ESOPHAGITIS: ICD-10-CM

## 2021-04-01 NOTE — TELEPHONE ENCOUNTER
Last appointment: 9/16/2020  Next appointment: Visit date not found  Last refill: 12/29/20      Sent Pivot3 message due for OV

## 2021-04-02 RX ORDER — OMEPRAZOLE 20 MG/1
CAPSULE, DELAYED RELEASE ORAL
Qty: 60 CAPSULE | Refills: 2 | Status: SHIPPED | OUTPATIENT
Start: 2021-04-02

## 2021-04-28 ENCOUNTER — TELEPHONE (OUTPATIENT)
Dept: INTERNAL MEDICINE CLINIC | Age: 56
End: 2021-04-28

## 2021-04-28 RX ORDER — SERTRALINE HYDROCHLORIDE 100 MG/1
200 TABLET, FILM COATED ORAL DAILY
Qty: 60 TABLET | Refills: 1 | Status: SHIPPED | OUTPATIENT
Start: 2021-04-28 | End: 2021-06-25 | Stop reason: SDUPTHER

## 2021-04-28 RX ORDER — BUPROPION HYDROCHLORIDE 150 MG/1
150 TABLET ORAL EVERY MORNING
Qty: 30 TABLET | Refills: 1 | Status: SHIPPED | OUTPATIENT
Start: 2021-04-28 | End: 2021-06-25 | Stop reason: SDUPTHER

## 2021-04-28 RX ORDER — QUETIAPINE FUMARATE 300 MG/1
TABLET, FILM COATED ORAL
Qty: 30 TABLET | Refills: 1 | Status: SHIPPED | OUTPATIENT
Start: 2021-04-28 | End: 2021-06-25 | Stop reason: SDUPTHER

## 2021-04-28 NOTE — TELEPHONE ENCOUNTER
Patient is requesting the following prescription(s):    1. buPROPion (WELLBUTRIN XL) 150 MG extended release tablet Take 1 tablet by mouth every morning   #30    2.    sertraline (ZOLOFT) 100 MG tablet Take 2 tablets by mouth daily   #60    3. QUEtiapine (SEROQUEL) 300 MG tablet Take 1 tablet by mouth nightly   #30    Patient made aware to allow 24 to 48 business hours for prescription refills. Pharmacy can be reached @ phone # provided if there are any questions.

## 2021-05-27 ENCOUNTER — OFFICE VISIT (OUTPATIENT)
Dept: INTERNAL MEDICINE CLINIC | Age: 56
End: 2021-05-27
Payer: MEDICARE

## 2021-05-27 VITALS
BODY MASS INDEX: 36.4 KG/M2 | DIASTOLIC BLOOD PRESSURE: 80 MMHG | OXYGEN SATURATION: 97 % | HEART RATE: 90 BPM | HEIGHT: 71 IN | SYSTOLIC BLOOD PRESSURE: 128 MMHG | RESPIRATION RATE: 14 BRPM | WEIGHT: 260 LBS

## 2021-05-27 DIAGNOSIS — G89.29 CHRONIC PAIN OF RIGHT KNEE: ICD-10-CM

## 2021-05-27 DIAGNOSIS — M25.561 CHRONIC PAIN OF RIGHT KNEE: ICD-10-CM

## 2021-05-27 DIAGNOSIS — Z12.11 COLON CANCER SCREENING: ICD-10-CM

## 2021-05-27 DIAGNOSIS — F33.41 RECURRENT MAJOR DEPRESSIVE DISORDER, IN PARTIAL REMISSION (HCC): Primary | ICD-10-CM

## 2021-05-27 DIAGNOSIS — F10.21 ALCOHOLISM IN REMISSION (HCC): ICD-10-CM

## 2021-05-27 PROCEDURE — 1036F TOBACCO NON-USER: CPT | Performed by: INTERNAL MEDICINE

## 2021-05-27 PROCEDURE — G8427 DOCREV CUR MEDS BY ELIG CLIN: HCPCS | Performed by: INTERNAL MEDICINE

## 2021-05-27 PROCEDURE — 3017F COLORECTAL CA SCREEN DOC REV: CPT | Performed by: INTERNAL MEDICINE

## 2021-05-27 PROCEDURE — 99214 OFFICE O/P EST MOD 30 MIN: CPT | Performed by: INTERNAL MEDICINE

## 2021-05-27 PROCEDURE — G8417 CALC BMI ABV UP PARAM F/U: HCPCS | Performed by: INTERNAL MEDICINE

## 2021-05-27 NOTE — PROGRESS NOTES
Hawa Chow (:  1965) is a 60 y.o. male,Established patient, here for evaluation of the following chief complaint(s):  Follow-up      ASSESSMENT/PLAN:  1. Recurrent major depressive disorder, in partial remission [facility])  Assessment & Plan: With some PTSD as well. Happy with meds for now. Was seeing Ky Dance. Encouraged him to follow up with her. 2. Alcoholism in remission [facility])  Assessment & Plan:   Well-controlled, continue current treatment plan  3. Colon cancer screening  -     AFL - Naina Rubio MD, Gastroenterology, [facility]  4. Chronic pain of right knee  Assessment & Plan:  Per patient severe OA. Will refer to ortho. Has had prior left TKA. Orders:  -     Bianca Roberts MD, Orthopedic Surgery, [facility]      Return in about 6 months (around 2021) for AWV+ chronic. SUBJECTIVE/OBJECTIVE:  HPI    Has had more motivation than usual over the last few weeks. More get up and go. More projects done around the house. Sleep has been better as well. Most recent addition was bupropion XL 150mg daily. Initially could not tell if was helpful but does feel it has been helpful addition more recently. Had been seeing Rima. Right knee still bothering him and keeping him from being more active. Review of Systems   Constitutional: Negative for chills, fatigue and fever. Respiratory: Negative for cough, shortness of breath and wheezing. Cardiovascular: Negative for chest pain, palpitations and leg swelling. Gastrointestinal: Negative for abdominal pain, constipation, diarrhea, nausea and vomiting. Musculoskeletal: Positive for arthralgias. Psychiatric/Behavioral: Negative for dysphoric mood and sleep disturbance. The patient is not nervous/anxious.         Current Outpatient Medications on File Prior to Visit   Medication Sig Dispense Refill    sertraline (ZOLOFT) 100 MG tablet TAKE 2 TABLETS BY MOUTH DAILY 60 tablet 1    buPROPion (WELLBUTRIN XL) 150 MG Status: He is alert. Mental status is at baseline. Coordination: Coordination normal.      Gait: Gait normal.   Psychiatric:         Mood and Affect: Mood normal.         Behavior: Behavior normal.           On this date 5/27/2021 I have spent 30 minutes reviewing previous notes, test results and face to face with the patient discussing the diagnosis and importance of compliance with the treatment plan as well as documenting on the day of the visit. An electronic signature was used to authenticate this note.     --Gamaliel Bucio MD

## 2021-05-28 PROBLEM — M25.561 CHRONIC PAIN OF RIGHT KNEE: Status: ACTIVE | Noted: 2021-05-28

## 2021-05-28 PROBLEM — G89.29 CHRONIC PAIN OF RIGHT KNEE: Status: ACTIVE | Noted: 2021-05-28

## 2021-05-28 ASSESSMENT — ENCOUNTER SYMPTOMS
DIARRHEA: 0
COUGH: 0
CONSTIPATION: 0
VOMITING: 0
NAUSEA: 0
SHORTNESS OF BREATH: 0
WHEEZING: 0
ABDOMINAL PAIN: 0

## 2021-05-28 NOTE — ASSESSMENT & PLAN NOTE
With some PTSD as well. Happy with meds for now. Was seeing Yasmin Mccollum. Encouraged him to follow up with her.

## 2021-06-01 ENCOUNTER — OFFICE VISIT (OUTPATIENT)
Dept: ORTHOPEDIC SURGERY | Age: 56
End: 2021-06-01
Payer: MEDICARE

## 2021-06-01 VITALS — HEIGHT: 71 IN | BODY MASS INDEX: 35 KG/M2 | WEIGHT: 250 LBS

## 2021-06-01 DIAGNOSIS — M17.11 LOCALIZED OSTEOARTHRITIS OF RIGHT KNEE: ICD-10-CM

## 2021-06-01 DIAGNOSIS — M25.561 RIGHT KNEE PAIN, UNSPECIFIED CHRONICITY: Primary | ICD-10-CM

## 2021-06-01 PROCEDURE — G8427 DOCREV CUR MEDS BY ELIG CLIN: HCPCS | Performed by: ORTHOPAEDIC SURGERY

## 2021-06-01 PROCEDURE — 1036F TOBACCO NON-USER: CPT | Performed by: ORTHOPAEDIC SURGERY

## 2021-06-01 PROCEDURE — 99203 OFFICE O/P NEW LOW 30 MIN: CPT | Performed by: ORTHOPAEDIC SURGERY

## 2021-06-01 PROCEDURE — G8417 CALC BMI ABV UP PARAM F/U: HCPCS | Performed by: ORTHOPAEDIC SURGERY

## 2021-06-01 PROCEDURE — 3017F COLORECTAL CA SCREEN DOC REV: CPT | Performed by: ORTHOPAEDIC SURGERY

## 2021-06-01 PROCEDURE — 20610 DRAIN/INJ JOINT/BURSA W/O US: CPT | Performed by: ORTHOPAEDIC SURGERY

## 2021-06-01 RX ORDER — METHYLPREDNISOLONE ACETATE 40 MG/ML
40 INJECTION, SUSPENSION INTRA-ARTICULAR; INTRALESIONAL; INTRAMUSCULAR; SOFT TISSUE ONCE
Status: COMPLETED | OUTPATIENT
Start: 2021-06-01 | End: 2021-06-01

## 2021-06-01 RX ADMIN — METHYLPREDNISOLONE ACETATE 40 MG: 40 INJECTION, SUSPENSION INTRA-ARTICULAR; INTRALESIONAL; INTRAMUSCULAR; SOFT TISSUE at 14:25

## 2021-06-01 NOTE — PROGRESS NOTES
[address]  Office Visit  New Patient  Date:  2021    Name:  Juanda Brittle McGill  Address:  [place] [address]    :  1965      Age:   60 y.o.    SSN:  [id number]      Medical Record Number:  <[id number]>    Chief Complaint:    Right knee pain    HPI:   Severo Vyas is a 60 y.o. male who presents for evaluation of his right knee. Patient has had right knee pain for several years. He is previously been treated for right knee osteoarthritis past and had a steroid injection done 2 years ago which he states helped his aching but not with his sharp pain. He takes over-the-counter anti-inflammatory occasions occasionally for pain relief. He denies any use of gel injections. Of note he does have a history of a left total knee arthroplasty performed 7-8 years ago which he states is doing doing okay. He does have stiffness however this has been there since the surgery. States he feels he is at the point that he would like to discuss having his right knee replaced. Feels he can walk only about 100 yards before he has to rest secondary to the pain. He does have pain at night occasionally at this point. He denies any new numbness, tingling, fevers, chills, chest pain, shortness of breath, or any other new significant symptoms. Pain Assessment  Location of Pain: Knee  Location Modifiers: Right  Severity of Pain: 8  Quality of Pain: Sharp, Dull  Duration of Pain: Persistent  Frequency of Pain: Constant  Aggravating Factors: Other (Comment) (n/a)  Relieving Factors:  Ice  Result of Injury: No  Work-Related Injury: No  Are there other pain locations you wish to document?: No    Past History:  Past Medical History:   Diagnosis Date    Anxiety     Arthritis     Depression        Past Surgical History:   Procedure Laterality Date    TONSILLECTOMY  1983    TOTAL KNEE ARTHROPLASTY Left 2015       Social History     Tobacco Use    Smoking status: Never Smoker    Smokeless tobacco: Never Used   Substance Use Topics    Alcohol use: Not Currently     Comment: alcoholic in remission    Drug use: Never        Family History:  family history includes Alcohol Abuse in his father; Arthritis in his father; Heart Disease in his father. Current Outpatient Medications:     sertraline (ZOLOFT) 100 MG tablet, TAKE 2 TABLETS BY MOUTH DAILY, Disp: 60 tablet, Rfl: 1    buPROPion (WELLBUTRIN XL) 150 MG extended release tablet, TAKE 1 TABLET BY MOUTH EVERY MORNING, Disp: 30 tablet, Rfl: 1    QUEtiapine (SEROQUEL) 300 MG tablet, TAKE 1 TABLET BY MOUTH EVERY NIGHT, Disp: 30 tablet, Rfl: 1    omeprazole (PRILOSEC) 20 MG delayed release capsule, TAKE 1 CAPSULE BY MOUTH TWICE DAILY, Disp: 60 capsule, Rfl: 2    metFORMIN (GLUCOPHAGE) 850 MG tablet, TAKE 1 TABLET BY MOUTH DAILY WITH BREAKFAST, Disp: 90 tablet, Rfl: 1    prazosin (MINIPRESS) 1 MG capsule, Take 3 capsules by mouth nightly, Disp: 90 capsule, Rfl: 1    Loratadine (CLARITIN PO), Take by mouth, Disp: , Rfl:     terbinafine (LAMISIL) 1 % cream, Apply topically 2 times daily to affected area for at least 2 weeks, Disp: 24 g, Rfl: 0      No Known Allergies      Review of Systems: A 14 point review of systems available in the scanned medical record as documented by the patient on 6/1/21. Today's review pertinent items are noted in HPI. Review of Systems   HENT:        Eye or hearing impairment, sinus or throat trouble   Cardiovascular:        Poor circulation   Gastrointestinal:        Hemorrhoids   Neurological:        Chronic pain   Psychiatric/Behavioral:        Depression or other problems   All other systems reviewed and are negative. Physical Exam:  Ht 5' 11\" (1.803 m)   Wt 250 lb (113.4 kg)   BMI 34.87 kg/m²     General: No acute distress, well nourished  CV: No obvious peripheral edema.  Normal peripheral pulses  Neuro: Alert & oriented x 3  Psych: Normal mood and affect    Knee Examination:  Right     Inspection: No edema or effusion  Alignment: Normal  Palpation: There is  moderate patellofemoral crepitus, there is  medial joint line tenderness. Range of Motion:   0-120 degrees  Strength: Motor is grossly intact  Stability: Knee stable with varus valgus testing at 0 and 30 degrees, negative Lachman  Special Tests:    Negative Esquivel, negative flexion  Gait:  Normal  Neuro: Sensation equal bilateral lower extremities   Vascular: 2+ posterior tibialis pulse        Contralateral Knee Comparison Examination:  Left     Inspection:   No edema or effusion  Alignment: Normal  Palpation: There is  no patellofemoral crepitus, there is  no medial or lateral joint line tenderness. Range of Motion:   0-90 degrees flexion  Strength: grossly intact  Stability: Knee stable with varus valgus testing at 0 and 30 degrees, negative anterior drawer  Gait:  Normal   Neuro: Sensation equal bilateral lower extremities  Vascular: 2+ posterior tibialis pulse    Radiographic:  X-rays obtained and reviewed in office, reviewed and interpreted by me today:  Views: 3 views  Location: Right knee  Impression: Patient has bone-on-bone arthritis involving the medial compartment of the right knee. Mild patellofemoral arthritic changes noted    Assessment:  Jackie Renner is a 60 y.o. male with:  1. Right knee osteoarthritis    Impression:  Encounter Diagnosis   Name Primary?  Right knee pain, unspecified chronicity Yes       Office Procedures:  Orders Placed This Encounter   Procedures    XR KNEE RIGHT (3 VIEWS)     Standing Status:   Future     Number of Occurrences:   1     Standing Expiration Date:   6/1/2022       Plan:   Pertinent imaging was reviewed. The etiology, natural history, and treatment options for the disorder were discussed. The roles of activity modifications, medications, injections, physical therapy, and surgical interventions were all described to the patient and questions were answered. Discussed with the patient that we would like him to get a little more optimized prior to a total joint replacement. I did review his x-rays and he does have bone-on-bone arthritis involving the medial compartment of the right knee. Would like him to get into physical therapy to work on strengthening and maintaining his range of motion. Would also like him to lose 20 to 30 pounds and he would also have to arrange for someone to stay with him immediately after the surgery to help him recover for a few days. Did recommend a steroid injection today, patient was agreeable to this. See procedure note. Patient can follow-up on an as-needed basis. Procedure noteright knee intra-articular steroid injection    Verbal consent obtained. Next the inferior lateral aspect of the right knee was sterilely prepped with chlorhexidine. Next a solution containing 2 cc of Depo-Medrol and 1 cc of lidocaine was injected intra-articularly into the right knee. Needle was withdrawn and Band-Aid applied. Patient tolerated the procedure well. This patient exhibits moderate complexity for obtaining an independent history, reviewing past medical records, independent interpretation of diagnostic imaging, and further coordinating care. The patient exhibits moderate risk. Approximately 30 minutes were spent reviewing past medical records, imaging, educating the patient, and coordinating care. Vish Ibrahim Fellow    The encounter with Rick Elder was supervised by Dr Keila Infante who personally examined the patient and reviewed the plan. This dictation was performed with a verbal recognition program (DRAGON) and it was checked for errors. It is possible that there are still dictated errors within this office note. If so, please bring any errors to my attention for an addendum. All efforts were made to ensure that this office note is accurate.    This dictation was performed with a verbal recognition program (DRAGON) and it was checked for errors. It is possible that there are still dictated errors within this office note. If so, please bring any errors to my attention for an addendum. All efforts were made to ensure that this office note is accurate. Supervising Physician Attestation:  I, Dr. Halle Koehler, personally performed the services described in this documentation as scribed above, and it is both accurate and complete and I agree with all pertinent clinical information. I personally interviewed the patient and performed a physical examination and medical decision making. I discussed the patient's condition and treatment options and have  reviewed and agree with the past medical, family and social history unless otherwise noted. All of the patient's questions were answered.       Board Certified Orthopaedic Surgeon  [facility] and [address]  [facility] and [address] [facility] [employer]  Professor of [address]

## 2021-06-01 NOTE — PROGRESS NOTES
Review of Systems   HENT:        Eye or hearing impairment, sinus or throat trouble   Cardiovascular:        Poor circulation   Gastrointestinal:        Hemorrhoids   Neurological:        Chronic pain   Psychiatric/Behavioral:        Depression or other problems   All other systems reviewed and are negative.

## 2021-06-10 ENCOUNTER — HOSPITAL ENCOUNTER (OUTPATIENT)
Dept: PHYSICAL THERAPY | Age: 56
Setting detail: THERAPIES SERIES
Discharge: HOME | End: 2021-06-10
Payer: MEDICARE

## 2021-06-10 NOTE — FLOWSHEET NOTE
The [facility] [address]    Physical Therapy  Cancellation/No-show Note  Patient Name:  Matthias Gilliam  :  1965   Date:  6/10/2021  Cancelled visits to date: 0  No-shows to date: 1    For today's appointment patient:  []  Cancelled  []  Rescheduled appointment  [x]  No-show     Reason given by patient:  []  Patient ill  []  Conflicting appointment   []  No transportation    []  Conflict with work  []  No reason given  []  Other:     Comments:      Electronically signed by:  Shakira Tilley PT

## 2021-06-30 NOTE — PROGRESS NOTES
discussed this recommendation with his PCP. He does note that his friend passed away recently, \"he drank himself to death\", which has worsened his mood. He continues to endorse difficulty doing ADLs, \"I think its related to depression. Or maybe self-hatred. I self-sabotage. I start to feel better and then do it to punish myself\". Continues to endorse anxiety, feeling nervous all the time and like he can't relax. Denies thoughts of self-harm and suicidal ideations. Recommend that he get established with therapy but he has concerns of not following through with referrals. BOUBACAR 7 SCORE 9/18/2020   BOUBACAR-7 Total Score 10     Interpretation of BOUBACAR-7 score: 5-9 = mild anxiety, 10-14 = moderate anxiety, 15+ = severe anxiety. Recommend referral to behavioral health for scores 10 or greater. No data recorded  Interpretation of PHQ-9 score:  1-4 = minimal depression, 5-9 = mild depression, 10-14 = moderate depression; 15-19 = moderately severe depression, 20-27 = severe depression    Review of Systems  Review of Systems   Constitutional: Negative for activity change, appetite change and unexpected weight change. HENT: Negative for congestion, sneezing and sore throat. Respiratory: Negative for chest tightness and shortness of breath. Cardiovascular: Negative for chest pain. Gastrointestinal: Negative for abdominal pain, diarrhea, nausea and vomiting. Musculoskeletal: Negative for arthralgias and myalgias. Neurological: Negative for dizziness, light-headedness and headaches. Psychiatric/Behavioral: Positive for dysphoric mood and sleep disturbance. Negative for self-injury and suicidal ideas. The patient is nervous/anxious. All other systems reviewed and are negative.      Current Meds  Current Outpatient Medications on File Prior to Visit   Medication Sig Dispense Refill    QUEtiapine (SEROQUEL) 300 MG tablet TAKE 1 TABLET BY MOUTH EVERY NIGHT 30 tablet 5    sertraline (ZOLOFT) 100 MG tablet Take 2 tablets by mouth daily 60 tablet 5    omeprazole (PRILOSEC) 20 MG delayed release capsule TAKE 1 CAPSULE BY MOUTH TWICE DAILY 60 capsule 2    metFORMIN (GLUCOPHAGE) 850 MG tablet TAKE 1 TABLET BY MOUTH DAILY WITH BREAKFAST 90 tablet 1    Loratadine (CLARITIN PO) Take by mouth      terbinafine (LAMISIL) 1 % cream Apply topically 2 times daily to affected area for at least 2 weeks 24 g 0     No current facility-administered medications on file prior to visit. History:  Past Medical History:   Diagnosis Date    Anxiety     Arthritis     Depression       Past Surgical History:   Procedure Laterality Date    TONSILLECTOMY  1983    TOTAL KNEE ARTHROPLASTY Left 2015     Family History   Problem Relation Age of Onset    Alcohol Abuse Father     Heart Disease Father     Arthritis Father     Colon Cancer Neg Hx     Breast Cancer Neg Hx     Prostate Cancer Neg Hx      Social History     Socioeconomic History    Marital status: Single     Spouse name: None    Number of children: None    Years of education: None    Highest education level: None   Occupational History    None   Tobacco Use    Smoking status: Never Smoker    Smokeless tobacco: Never Used   Substance and Sexual Activity    Alcohol use: Not Currently     Comment: alcoholic in remission    Drug use: Never    Sexual activity: Not Currently   Other Topics Concern    None   Social History Narrative    Prior nuclear medicine tech    Now on SSDI     Social Determinants of Health     Financial Resource Strain:     Difficulty of Paying Living Expenses:    Food Insecurity:     Worried About Running Out of Food in the Last Year:     Ran Out of Food in the Last Year:    Transportation Needs:     Lack of Transportation (Medical):      Lack of Transportation (Non-Medical):    Physical Activity:     Days of Exercise per Week:     Minutes of Exercise per Session:    Stress:     Feeling of Stress :    Social Connections:     Frequency of Communication with Friends and Family:     Frequency of Social Gatherings with Friends and Family:     Attends [employer] Services:     Active Member of Clubs or Organizations:     Attends Club or Organization Meetings:     Marital Status:    Intimate Partner Violence:     Fear of Current or Ex-Partner:     Emotionally Abused:     Physically Abused:     Sexually Abused:      Not in a hospital admission. No Known Allergies     OBJECTIVE  Vital Signs: There were no vitals filed for this visit.     Physical Exam:  Constitutional: No acute distress noted  HEENT: Atraumatic  Cardiovascular: No distress noted  Respiratory: No distress noted  Neurological: No cranial nerve abnormalities apparent  MS: No abnormalities apparent  Gait: Observed via telehealth, gait deferred   Psychiatric: Please see below    Labs:  Orders Only on 05/27/2021   Component Date Value    Hemoglobin A1C 05/27/2021 5.6     eAG 05/27/2021 114.0     PSA 05/27/2021 0.56     Vit D, 25-Hydroxy 05/27/2021 22.5*    Cholesterol, Total 05/27/2021 191     Triglycerides 05/27/2021 182*    HDL 05/27/2021 36*    LDL Calculated 05/27/2021 119*    VLDL Cholesterol Calcula* 05/27/2021 36     TSH 05/27/2021 1.78     WBC 05/27/2021 6.9     RBC 05/27/2021 4.80     Hemoglobin 05/27/2021 14.2     Hematocrit 05/27/2021 40.4*    MCV 05/27/2021 84.3     MCH 05/27/2021 29.6     MCHC 05/27/2021 35.1     RDW 05/27/2021 13.9     Platelets 20/40/9591 171     MPV 05/27/2021 8.4     Neutrophils % 05/27/2021 70.9     Lymphocytes % 05/27/2021 19.5     Monocytes % 05/27/2021 6.6     Eosinophils % 05/27/2021 2.2     Basophils % 05/27/2021 0.8     Neutrophils Absolute 05/27/2021 4.9     Lymphocytes Absolute 05/27/2021 1.3     Monocytes Absolute 05/27/2021 0.5     Eosinophils Absolute 05/27/2021 0.1     Basophils Absolute 05/27/2021 0.1     Sodium 05/27/2021 133*    Potassium 05/27/2021 4.4     Chloride 05/27/2021 99     CO2 05/27/2021 23     Anion Gap 05/27/2021 11     Glucose 05/27/2021 88     BUN 05/27/2021 11     CREATININE 05/27/2021 0.9     GFR Non- 05/27/2021 >60     GFR  05/27/2021 >60     Calcium 05/27/2021 9.6     Total Protein 05/27/2021 7.2     Albumin 05/27/2021 4.4     Albumin/Globulin Ratio 05/27/2021 1.6     Total Bilirubin 05/27/2021 0.6     Alkaline Phosphatase 05/27/2021 74     ALT 05/27/2021 27     AST 05/27/2021 26     Globulin 05/27/2021 2.8       Last Drug screen:   No results found for: Marleni Audubon, LABBENZ, COCAIMETSCRU, THC, MDMA, LABMETH, OPIATESCREENURINE, OXTCOSU, PHENCYCLIDINESCREENURINE, PROPOXYPHENE, METAMPU    PSYCHIATRIC ASSESSMENT     Mental Status Examination:    Appearance: WM, appears stated age, wearing personal attire, good grooming and hygiene  Behavior/Attitude Toward Examiner: Cooperative, attentive, good eye contact  Speech: Spontaneous, normal rate, normal volume and well articulated   Mood: \"Okay\"  Affect: Flat  Thought Processes: Logical  Thought Content: Denies SI, no HI verbalized, no delusions voiced, no obsessions   Perceptions: No AVH verbalized, did not appear to be RTIS   Attention: Intact  Abstraction: Intact  Cognition: Alert and oriented to person, place, time, and situation, recall intact  Insight: WNL   Judgment: WNL     Diagnoses  Encounter Diagnoses   Name Primary?  Moderate episode of recurrent major depressive disorder (HCC) Yes    Posttraumatic stress disorder     Anxiety     Alcoholism in remission [facility])       Plan  Reviewed nursing and ancillary staff notes. Evaluated medications and assessed for side effects and effectiveness. Assessed patient's educational needs including reviewing Plan of Care, medications and diagnosis. 1. Safety: NO Imminent risk of danger to/self/others based on the factors considered below. Appropriate for outpatient level of care.   Safety plan includes: 911, PES, hotlines, and interventions discussed today.   · Risk factors: Age <25 or >49, male gender, depressed mood, prior suicide attempt, social isolation, no collateral information to support safety. · Protective factors: Denies suicidal ideation/plan/intent, does not have access to guns or weapons, patient is future oriented in conversation and is marc for safety, no active substance abuse, no active psychosis or cognitive dysfunction. 2.  Psychiatric   Medications: Zoloft 200 mg daily, Seroquel 300 mg HS. R/B/SE/A reviewed with patient who consents to treatment.  OARRS ran and reviewed 02/23/2021   Labs: reviewed in [place] [id number] therapy.  02/23: Will adjunct Zoloft with Wellbutrin  mg daily. Reviewed Seroquel's class and purpose of medication, goal is to taper and discontinue this medication if able as it may be contributing to lethargy, weight gain, cravings, but will need to monitor as patient reportedly takes it for sleep and that is one of his main concerns. Also reviewed s/s of SS. Restart Prazosin.  07/01: Recommend sleep study; PCP aware. Will increase Wellbutrin XL to 300 mg daily. Reviewed s/s of SS with patient, which he verbalized understanding. 3.  Substance Abuse    No active issues  4. Medical   Following with Romeo Turner MD  5. Return in about 4 weeks (around 7/29/2021). Total time: 20 minutes spent with patient completing evaluation process and more than 50% of the time was spent completing evaluation and planning treatment with the patient. Rima Park, MPH, PMHNP-BC  07/01/21

## 2021-07-01 ENCOUNTER — VIRTUAL VISIT (OUTPATIENT)
Dept: PSYCHIATRY | Age: 56
End: 2021-07-01
Payer: MEDICARE

## 2021-07-01 DIAGNOSIS — F10.21 ALCOHOLISM IN REMISSION (HCC): ICD-10-CM

## 2021-07-01 DIAGNOSIS — F33.1 MODERATE EPISODE OF RECURRENT MAJOR DEPRESSIVE DISORDER (HCC): Primary | ICD-10-CM

## 2021-07-01 DIAGNOSIS — F43.10 POSTTRAUMATIC STRESS DISORDER: ICD-10-CM

## 2021-07-01 DIAGNOSIS — F41.9 ANXIETY: ICD-10-CM

## 2021-07-01 PROCEDURE — 99213 OFFICE O/P EST LOW 20 MIN: CPT | Performed by: NURSE PRACTITIONER

## 2021-07-01 RX ORDER — BUPROPION HYDROCHLORIDE 300 MG/1
300 TABLET ORAL EVERY MORNING
Qty: 30 TABLET | Refills: 5 | Status: SHIPPED | OUTPATIENT
Start: 2021-07-01 | End: 2022-03-07 | Stop reason: SDUPTHER

## 2021-07-01 RX ORDER — PRAZOSIN HYDROCHLORIDE 1 MG/1
3 CAPSULE ORAL NIGHTLY
Qty: 90 CAPSULE | Refills: 5 | Status: SHIPPED | OUTPATIENT
Start: 2021-07-01 | End: 2022-08-25

## 2021-07-01 ASSESSMENT — ENCOUNTER SYMPTOMS
SHORTNESS OF BREATH: 0
VOMITING: 0
DIARRHEA: 0
NAUSEA: 0
CHEST TIGHTNESS: 0
SORE THROAT: 0
ABDOMINAL PAIN: 0

## 2021-07-30 ENCOUNTER — TELEPHONE (OUTPATIENT)
Dept: INTERNAL MEDICINE CLINIC | Age: 56
End: 2021-07-30

## 2021-07-30 NOTE — TELEPHONE ENCOUNTER
Britt Draper with [facility] is wanting clarification on the Welbtrin 150;s. These are set to auto re-fill but patient now also takes Welbutrin 300. Should the 300's be replacing the 150's. Please call to discuss at number provided. Thanks.

## 2021-07-30 NOTE — TELEPHONE ENCOUNTER
Wellbutrin was increased to 300 mg daily at last appointment 07/01/2021. Call placed to [facility], spoke to Jennifer Braun, to confirm that filled prescriptions should be  mg.

## 2021-08-31 ENCOUNTER — OFFICE VISIT (OUTPATIENT)
Dept: INTERNAL MEDICINE CLINIC | Age: 56
End: 2021-08-31
Payer: COMMERCIAL

## 2021-08-31 VITALS
OXYGEN SATURATION: 96 % | HEART RATE: 100 BPM | RESPIRATION RATE: 14 BRPM | WEIGHT: 260 LBS | DIASTOLIC BLOOD PRESSURE: 64 MMHG | SYSTOLIC BLOOD PRESSURE: 104 MMHG | BODY MASS INDEX: 36.26 KG/M2

## 2021-08-31 DIAGNOSIS — G47.19 EXCESSIVE DAYTIME SLEEPINESS: ICD-10-CM

## 2021-08-31 DIAGNOSIS — R06.83 SNORING: ICD-10-CM

## 2021-08-31 DIAGNOSIS — Z12.11 COLON CANCER SCREENING: ICD-10-CM

## 2021-08-31 DIAGNOSIS — J30.89 ALLERGIC RHINITIS DUE TO OTHER ALLERGIC TRIGGER, UNSPECIFIED SEASONALITY: Primary | ICD-10-CM

## 2021-08-31 DIAGNOSIS — M35.00 SICCA COMPLEX (HCC): ICD-10-CM

## 2021-08-31 PROCEDURE — 99214 OFFICE O/P EST MOD 30 MIN: CPT | Performed by: INTERNAL MEDICINE

## 2021-08-31 RX ORDER — CETIRIZINE HYDROCHLORIDE 10 MG/1
10 TABLET ORAL DAILY
Qty: 30 TABLET | Refills: 5 | Status: SHIPPED | OUTPATIENT
Start: 2021-08-31 | End: 2022-01-17

## 2021-08-31 ASSESSMENT — ENCOUNTER SYMPTOMS
COUGH: 0
WHEEZING: 0
SHORTNESS OF BREATH: 0

## 2021-08-31 NOTE — PROGRESS NOTES
Kamilah Mckee (:  1965) is a 60 y.o. male,Established patient, here for evaluation of the following chief complaint(s):  Follow-up (discuss getting sleep study ) and Dehydration (patient having signs of dehydration, dry mouth x25 years, dry eyes )      ASSESSMENT/PLAN:  1. Allergic rhinitis due to other allergic trigger, unspecified seasonality  Does have nasal allergy symptoms - discussed that antihistamines may be contributing to his dry eye but he believes the irritation and discharge are from allergies. Will rx zyrtec and have him minimize OTC benadryl use. -     cetirizine (ZYRTEC) 10 MG tablet; Take 1 tablet by mouth daily, Disp-30 tablet, R-5Normal  2. Sicca complex (HCC)  Given dry mouth and dry eye will check CRICKET and SSA/SSA abs. Monitor swallowing in future. -     CRICKET; Future  -     Anti SSA; Future  -     Anti SSB; Future  3. Excessive daytime sleepiness  Refer for sleep eval possible sleep study. -     Sophia Weaver MD, Sleep Medicine, [facility]  4. Snoring  -     Sophia Weaver MD, Sleep Medicine, [facility]  5. [facility] cancer screening  -     [facility] Future      Return in about 4 months (around 2021) for chronic conditions. SUBJECTIVE/OBJECTIVE:  HPI    Over the years have been told that needs to look into sleep apnea diagnosis. Lots of daytime fatigue. Required medication to sleep. Breathes through mouth because has deviated septum and allergies. This causes mouth/throat dryness. No headaches. +snoring. Lots of dry eye and dry mouth. White discharge from eyes. Thinks allergies. Dry mouth both at night and during today. Sometimes causes choking sensation. Review of Systems   Constitutional: Negative for chills, fatigue and fever. Respiratory: Negative for cough, shortness of breath and wheezing. Cardiovascular: Negative for chest pain, palpitations and leg swelling.    Psychiatric/Behavioral: Positive for sleep disturbance. Negative for dysphoric mood and suicidal ideas. The patient is not nervous/anxious. Current Outpatient Medications on File Prior to Visit   Medication Sig Dispense Refill    prazosin (MINIPRESS) 1 MG capsule Take 3 capsules by mouth nightly 90 capsule 5    buPROPion (WELLBUTRIN XL) 300 MG extended release tablet Take 1 tablet by mouth every morning 30 tablet 5    metFORMIN (GLUCOPHAGE) 850 MG tablet Take 1 tablet by mouth daily (with breakfast) 90 tablet 1    QUEtiapine (SEROQUEL) 300 MG tablet TAKE 1 TABLET BY MOUTH EVERY NIGHT 30 tablet 5    sertraline (ZOLOFT) 100 MG tablet Take 2 tablets by mouth daily 60 tablet 5    omeprazole (PRILOSEC) 20 MG delayed release capsule TAKE 1 CAPSULE BY MOUTH TWICE DAILY 60 capsule 2     No current facility-administered medications on file prior to visit. Vitals:    08/31/21 1356   BP: 104/64   Pulse: 100   Resp: 14   SpO2: 96%     Physical Exam  Constitutional:       General: He is not in acute distress. Appearance: Normal appearance. HENT:      Head: Normocephalic and atraumatic. Right Ear: External ear normal.      Left Ear: External ear normal.      Nose: Nose normal.      Mouth/Throat:      Mouth: Mucous membranes are moist.      Pharynx: Oropharynx is clear. Eyes:      General: No scleral icterus. Conjunctiva/sclera: Conjunctivae normal.   Neck:      Thyroid: No thyroid mass, thyromegaly or thyroid tenderness. Vascular: No carotid bruit. Cardiovascular:      Rate and Rhythm: Normal rate and regular rhythm. Pulses: Normal pulses. Heart sounds: Normal heart sounds. No murmur heard. No friction rub. No gallop. Pulmonary:      Effort: Pulmonary effort is normal.      Breath sounds: Normal breath sounds. No wheezing, rhonchi or rales. Abdominal:      General: Abdomen is flat. Bowel sounds are normal.      Palpations: Abdomen is soft. Musculoskeletal:         General: Normal range of motion. Cervical back: Normal range of motion and neck supple. Right lower leg: No edema. Left lower leg: No edema. Lymphadenopathy:      Cervical: No cervical adenopathy. Skin:     General: Skin is warm and dry. Findings: No rash. Neurological:      General: No focal deficit present. Mental Status: He is alert. Mental status is at baseline. Coordination: Coordination normal.      Gait: Gait normal.   Psychiatric:         Mood and Affect: Mood normal.         Behavior: Behavior normal.           On this date 8/31/2021 I have spent 30 minutes reviewing previous notes, test results and face to face with the patient discussing the diagnosis and importance of compliance with the treatment plan as well as documenting on the day of the visit. An electronic signature was used to authenticate this note.     --Ehsan Francisco MD

## 2021-12-20 RX ORDER — QUETIAPINE FUMARATE 300 MG/1
TABLET, FILM COATED ORAL
Qty: 30 TABLET | Refills: 5 | Status: SHIPPED | OUTPATIENT
Start: 2021-12-20 | End: 2022-05-27

## 2021-12-20 NOTE — TELEPHONE ENCOUNTER
Last appointment: 8/31/2021  Next appointment: Visit date not found  Last refill:   Sent [employer] message to schedule due/overdue appointment.

## 2021-12-23 ENCOUNTER — VIRTUAL VISIT (OUTPATIENT)
Dept: PULMONOLOGY | Age: 56
End: 2021-12-23
Payer: COMMERCIAL

## 2021-12-23 DIAGNOSIS — G47.10 HYPERSOMNIA: Primary | ICD-10-CM

## 2021-12-23 DIAGNOSIS — G47.50 PARASOMNIA, UNSPECIFIED TYPE: ICD-10-CM

## 2021-12-23 DIAGNOSIS — E66.9 NON MORBID OBESITY, UNSPECIFIED OBESITY TYPE: Chronic | ICD-10-CM

## 2021-12-23 DIAGNOSIS — K21.9 GASTROESOPHAGEAL REFLUX DISEASE WITHOUT ESOPHAGITIS: Chronic | ICD-10-CM

## 2021-12-23 DIAGNOSIS — F41.9 ANXIETY: Chronic | ICD-10-CM

## 2021-12-23 PROBLEM — F43.10 POSTTRAUMATIC STRESS DISORDER: Chronic | Status: ACTIVE | Noted: 2018-12-18

## 2021-12-23 PROBLEM — J30.2 SEASONAL ALLERGIES: Chronic | Status: ACTIVE | Noted: 2019-01-08

## 2021-12-23 PROBLEM — F32.9 MAJOR DEPRESSIVE DISORDER: Chronic | Status: ACTIVE | Noted: 2018-12-18

## 2021-12-23 PROCEDURE — 99204 OFFICE O/P NEW MOD 45 MIN: CPT | Performed by: INTERNAL MEDICINE

## 2021-12-23 ASSESSMENT — SLEEP AND FATIGUE QUESTIONNAIRES
HOW LIKELY ARE YOU TO NOD OFF OR FALL ASLEEP WHILE SITTING AND TALKING TO SOMEONE: 1
ESS TOTAL SCORE: 2
HOW LIKELY ARE YOU TO NOD OFF OR FALL ASLEEP WHILE WATCHING TV: 0
HOW LIKELY ARE YOU TO NOD OFF OR FALL ASLEEP IN A CAR, WHILE STOPPED FOR A FEW MINUTES IN TRAFFIC: 0
HOW LIKELY ARE YOU TO NOD OFF OR FALL ASLEEP WHILE SITTING QUIETLY AFTER LUNCH WITHOUT ALCOHOL: 0
HOW LIKELY ARE YOU TO NOD OFF OR FALL ASLEEP WHILE LYING DOWN TO REST IN THE AFTERNOON WHEN CIRCUMSTANCES PERMIT: 1
HOW LIKELY ARE YOU TO NOD OFF OR FALL ASLEEP WHILE SITTING INACTIVE IN A PUBLIC PLACE: 0
HOW LIKELY ARE YOU TO NOD OFF OR FALL ASLEEP WHEN YOU ARE A PASSENGER IN A CAR FOR AN HOUR WITHOUT A BREAK: 0
HOW LIKELY ARE YOU TO NOD OFF OR FALL ASLEEP WHILE SITTING AND READING: 0

## 2021-12-23 NOTE — LETTER
[facility] Sleep [facility]  [place] [address] [place] [address]  Phone: [phone]  Fax: [phone]           Luz Bobo MD      December 23, 2021     Patient: Daphney Brittle   MR Number: <[id number]>   YOB: 1965   Date of Visit: 12/23/2021       Dear Dr. Arellano Records: Thank you for referring Ovidio Johnson to me for evaluation/treatment. Below are the relevant portions of my assessment and plan of care. Visit Diagnoses and Associated Orders     Hypersomnia   (New Problem)  -  Primary    needs work-up    Home Sleep Study (HST) [09226 Custom]   - Future Order         Parasomnia, unspecified type   (New Problem)      needs work-up    Home Sleep Study (HST) [39788 Custom]   - Future Order         Gastroesophageal reflux disease without esophagitis   (Stable)           Anxiety   (Stable)           Non morbid obesity, unspecified obesity type   (Not Stable)                 One or more undiagnosed new problem with uncertain prognosis till final diagnosis is made. Differential diagnosis includes but not limited to: AMOL, PLMD's, narcolepsy, parasomnias. Reviewed AMOL (highest likelihood Dx): pathophysiology, diagnosis, complications and treatment. Instructed him not to drive if drowsy. Continue medications per her PCP and other physicians. Limit caffeine use after 3pm. Standard of care is to do in-lab PSG but insurance is mandating an inferior HST. 1 wk follow up after study to review his results. The chronic medical conditions listed are directly related to the primary diagnosis listed above. The management of the primary diagnosis affects the secondary diagnosis and vice versa. Reviewed referral note from patient's primary care doctor dated 8/31/2021 showing the patient with snoring and hypersomnia.  Reviewed the final labs on 5/27/2021: CMP, CBC, TSHall were essentially normal.    This information was analyzed to assess complexity and medical decision making in regards to further testing and management. Continue meds for: GERD and BOUBACAR. Pt would medically benefit from wt loss for AMOL (diet, exercise, surgical). Orders Placed This Encounter   Procedures    Home Sleep Study (HST)       If you have questions, please do not hesitate to call me. I look forward to following Nery Gerardo along with you.     Sincerely,        Aleida Gonzales MD    CC providers:  Daniel Schmidt MD  [address]

## 2021-12-23 NOTE — PROGRESS NOTES
Jennifer Mccullough MD, Rebecca Langley, [facility]  Tiffanie Kehrt CNP  Sandee Dodd CNP [address]ol[address]  [address] [facility], [address] [facility] [phone]   [facility] [address] Gurpreet hCase. [facility]. Jim [address] [phone]     Video Visit- Consult    Pursuant to the emergency declaration under the [address] [place] [address] waiver authority and the [employer] Act, this Virtual  Visit was conducted, with patient's consent, to reduce the patient's risk of exposure to COVID-19. Services were provided through a video synchronous discussion virtually to substitute for in-person clinic visit. Patient was located in their home. Assessment:      Visit Diagnoses and Associated Orders     Hypersomnia   (New Problem)  -  Primary    needs work-up    Home Sleep Study (HST) [[id number] Custom]   - Future Order         Parasomnia, unspecified type   (New Problem)      needs work-up    Home Sleep Study (HST) [[id number] Custom]   - Future Order         Gastroesophageal reflux disease without esophagitis   (Stable)           Anxiety   (Stable)           Non morbid obesity, unspecified obesity type   (Not Stable)                  Plan:      One or more undiagnosed new problem with uncertain prognosis till final diagnosis is made. Differential diagnosis includes but not limited to: AMOL, PLMD's, narcolepsy, parasomnias. Reviewed AMOL (highest likelihood Dx): pathophysiology, diagnosis, complications and treatment. Instructed him not to drive if drowsy. Continue medications per her PCP and other physicians. Limit caffeine use after 3pm. Standard of care is to do in-lab PSG but insurance is mandating an inferior HST. 1 wk follow up after study to review his results. The chronic medical conditions listed are directly related to the primary diagnosis listed above. The management of the primary diagnosis affects the secondary diagnosis and vice versa.     Reviewed referral note from patient's primary care doctor dated 8/31/2021 showing the patient with snoring and hypersomnia. Reviewed the final labs on 5/27/2021: CMP, CBC, TSHall were essentially normal.    This information was analyzed to assess complexity and medical decision making in regards to further testing and management. Continue meds for: GERD and BOUBACAR. Pt would medically benefit from wt loss for AMOL (diet, exercise, surgical). Orders Placed This Encounter   Procedures    Home Sleep Study (HST)          Subjective:     Patient ID: Collette Conroy is a 60 y.o. male. Chief Complaint   Patient presents with    Daytime Sleepiness       HPI:      Collette Conroy is a 60 y.o. male referred by Taylor Lakhani MD for a sleep evaluation. He complains of: excessive daytime sleepiness , non-restorative sleep, nocturia, waking choking/gasping, AM mouth dryness and tossing and turning at night. He denies: cataplexy and hypnagogic hallucinations. Symptoms began several years ago, gradually worsening since that time. Cannot fall or maintain sleep despite seroquel and prazosin. Having issues with nightmares but not sure if prazosin is helping at 3 mg. DOT/CDL - No  FAA/'s license -No    Previous Report(s) Reviewed: historical medical records, office notes, and referral letter(s). Pertinent data has been documented.     [place] - Total score: 2    Caffeine Intake - Coffee: 2 cup(s) per day    Social History     Socioeconomic History    Marital status: Single     Spouse name: Not on file    Number of children: Not on file    Years of education: Not on file    Highest education level: Not on file   Occupational History    Not on file   Tobacco Use    Smoking status: Never Smoker    Smokeless tobacco: Never Used   Substance and Sexual Activity    Alcohol use: Not Currently     Comment: alcoholic in remission    Drug use: Never    Sexual activity: Not Currently   Other Topics Concern    Not on file Social History Narrative    Prior nuclear medicine tech    Now on SSDI     Social Determinants of Health     Financial Resource Strain:     Difficulty of Paying Living Expenses: Not on file   Food Insecurity:     Worried About Running Out of Food in the Last Year: Not on file    [place] of Food in the Last Year: Not on file   Transportation Needs:     Lack of Transportation (Medical): Not on file    Lack of Transportation (Non-Medical):  Not on file   Physical Activity:     Days of Exercise per Week: Not on file    Minutes of Exercise per Session: Not on file   Stress:     Feeling of Stress : Not on file   Social Connections:     Frequency of Communication with Friends and Family: Not on file    Frequency of Social Gatherings with Friends and Family: Not on file    Attends Gnosticist Services: Not on file    Active Member of [address] [place] [address] or Organizations: Not on file    Attends Club or Organization Meetings: Not on file    Marital Status: Not on file   Intimate Partner Violence:     Fear of Current or Ex-Partner: Not on file    Emotionally Abused: Not on file    Physically Abused: Not on file    Sexually Abused: Not on file   Housing Stability:     Unable to Pay for Housing in the Last Year: Not on file    Number of [place] in the Last Year: Not on file    Unstable Housing in the Last Year: Not on file        Current Outpatient Medications   Medication Sig Dispense Refill    QUEtiapine (SEROQUEL) 300 MG tablet TAKE 1 TABLET BY MOUTH EVERY NIGHT 30 tablet 5    cetirizine (ZYRTEC) 10 MG tablet Take 1 tablet by mouth daily 30 tablet 5    prazosin (MINIPRESS) 1 MG capsule Take 3 capsules by mouth nightly 90 capsule 5    buPROPion (WELLBUTRIN XL) 300 MG extended release tablet Take 1 tablet by mouth every morning 30 tablet 5    metFORMIN (GLUCOPHAGE) 850 MG tablet Take 1 tablet by mouth daily (with breakfast) 90 tablet 1    sertraline (ZOLOFT) 100 MG tablet Take 2 tablets by mouth daily 60 tablet 5    omeprazole (PRILOSEC) 20 MG delayed release capsule TAKE 1 CAPSULE BY MOUTH TWICE DAILY 60 capsule 2     No current facility-administered medications for this visit. Allergies as of 12/23/2021    (No Known Allergies)       Patient Active Problem List   Diagnosis    Alcoholism in remission ([address]    Anxiety    Gastroesophageal reflux disease    Major depressive disorder    Posttraumatic stress disorder    Seasonal allergies    Status post left knee replacement    Recurrent major depressive disorder, in partial remission (HCC)    Chronic pain of right knee    Non morbid obesity, unspecified obesity type       Past Medical History:   Diagnosis Date    Anxiety     Arthritis     Depression        Family History   Problem Relation Age of Onset    Alcohol Abuse Father     Heart Disease Father     Arthritis Father     Colon Cancer Neg Hx     Breast Cancer Neg Hx     Prostate Cancer Neg Hx        Objective:     Vitals:  Patient reported Height and Weight Calculated BMI   Patient-Reported Vitals 12/23/2021   Patient-Reported Weight 250   Patient-Reported Height 5.11       34.9     Due to COVID-19 this was a virtual visit and physical exam was deferred.     Electronically signed by Sabina Layne MD on12/23/2021 at 2:20 PM

## 2022-01-17 ENCOUNTER — TELEMEDICINE (OUTPATIENT)
Dept: INTERNAL MEDICINE CLINIC | Age: 57
End: 2022-01-17
Payer: COMMERCIAL

## 2022-01-17 DIAGNOSIS — F41.9 ANXIETY: ICD-10-CM

## 2022-01-17 DIAGNOSIS — F10.21 ALCOHOLISM IN REMISSION (HCC): ICD-10-CM

## 2022-01-17 DIAGNOSIS — G47.19 EXCESSIVE DAYTIME SLEEPINESS: ICD-10-CM

## 2022-01-17 DIAGNOSIS — M35.00 SICCA COMPLEX (HCC): ICD-10-CM

## 2022-01-17 DIAGNOSIS — F33.41 RECURRENT MAJOR DEPRESSIVE DISORDER, IN PARTIAL REMISSION (HCC): Primary | ICD-10-CM

## 2022-01-17 DIAGNOSIS — F43.10 POSTTRAUMATIC STRESS DISORDER: ICD-10-CM

## 2022-01-17 PROCEDURE — 99443 PR PHYS/QHP TELEPHONE EVALUATION 21-30 MIN: CPT | Performed by: INTERNAL MEDICINE

## 2022-01-17 ASSESSMENT — PATIENT HEALTH QUESTIONNAIRE - PHQ9
3. TROUBLE FALLING OR STAYING ASLEEP: 2
5. POOR APPETITE OR OVEREATING: 2
8. MOVING OR SPEAKING SO SLOWLY THAT OTHER PEOPLE COULD HAVE NOTICED. OR THE OPPOSITE, BEING SO FIGETY OR RESTLESS THAT YOU HAVE BEEN MOVING AROUND A LOT MORE THAN USUAL: 0
7. TROUBLE CONCENTRATING ON THINGS, SUCH AS READING THE NEWSPAPER OR WATCHING TELEVISION: 1
SUM OF ALL RESPONSES TO PHQ QUESTIONS 1-9: 9
10. IF YOU CHECKED OFF ANY PROBLEMS, HOW DIFFICULT HAVE THESE PROBLEMS MADE IT FOR YOU TO DO YOUR WORK, TAKE CARE OF THINGS AT HOME, OR GET ALONG WITH OTHER PEOPLE: 1
4. FEELING TIRED OR HAVING LITTLE ENERGY: 2
SUM OF ALL RESPONSES TO PHQ QUESTIONS 1-9: 9
6. FEELING BAD ABOUT YOURSELF - OR THAT YOU ARE A FAILURE OR HAVE LET YOURSELF OR YOUR FAMILY DOWN: 1
2. FEELING DOWN, DEPRESSED OR HOPELESS: 1
SUM OF ALL RESPONSES TO PHQ9 QUESTIONS 1 & 2: 1
9. THOUGHTS THAT YOU WOULD BE BETTER OFF DEAD, OR OF HURTING YOURSELF: 0
1. LITTLE INTEREST OR PLEASURE IN DOING THINGS: 0
SUM OF ALL RESPONSES TO PHQ QUESTIONS 1-9: 9
SUM OF ALL RESPONSES TO PHQ QUESTIONS 1-9: 9

## 2022-01-17 NOTE — PROGRESS NOTES
Shanna Garrison is a 60 y.o. male evaluated via telephone on 1/17/2022. Consent:  He and/or health care decision maker is aware that that he may receive a bill for this telephone service, which includes applicable co-pays, depending on his insurance coverage, and has provided verbal consent to proceed: Yes      Documentation:  I communicated with the patient and/or health care decision maker about chronic conditions. Details of this discussion including any medical advice provided:    Overall he is doing well. Trying to get out of the house. Mostly sleeping well but occasionally cannot sleep and then will sleep until 3. Mood has been okay. No SI. Mostly anxiety has been more of an issue lately  Continuing on his current medications - still wants to see psych - will refer    Has not done his cologuard - encouraged    Wants booster arranged - will schedule for Thursday. I affirm this is a Patient Initiated Episode with a Patient who has not had a related appointment within my department in the past 7 days or scheduled within the next 24 hours. Patient identification was verified at the start of the visit: Yes    Total Time: minutes: 21-30 minutes    The visit was conducted pursuant to the emergency declaration under the [address], [address] [place] [address] authority and the [employer] and [place] General Act. Patient identification was verified, and a caregiver was present when appropriate. The patient was located in a state where the provider was licensed to provide care.     Note: not billable if this call serves to triage the patient into an appointment for the relevant concern      Sanjuanita Meyer MD

## 2022-01-18 ENCOUNTER — TELEPHONE (OUTPATIENT)
Dept: INTERNAL MEDICINE CLINIC | Age: 57
End: 2022-01-18

## 2022-01-18 NOTE — TELEPHONE ENCOUNTER
LM asking patient to call back and schedule 4 month OV with Dr. Karen Millard and schedule with Dr. Demetra Barrios (new patient)

## 2022-03-07 ENCOUNTER — PATIENT MESSAGE (OUTPATIENT)
Dept: INTERNAL MEDICINE CLINIC | Age: 57
End: 2022-03-07

## 2022-03-07 RX ORDER — BUPROPION HYDROCHLORIDE 300 MG/1
300 TABLET ORAL EVERY MORNING
Qty: 30 TABLET | Refills: 5 | Status: SHIPPED | OUTPATIENT
Start: 2022-03-07 | End: 2022-09-26

## 2022-03-07 RX ORDER — SERTRALINE HYDROCHLORIDE 100 MG/1
200 TABLET, FILM COATED ORAL DAILY
Qty: 60 TABLET | Refills: 5 | Status: SHIPPED | OUTPATIENT
Start: 2022-03-07 | End: 2022-09-26

## 2022-03-07 NOTE — TELEPHONE ENCOUNTER
From: Hudson Cohen  To: Dr. Jacome Root: 3/7/2022 3:56 PM EST  Subject: Bupropion    Dr. Ernie Cervantes can you refill my presecription of bupropion please?  300mg   Thanks,   Dejon Wong

## 2022-05-27 RX ORDER — QUETIAPINE FUMARATE 300 MG/1
TABLET, FILM COATED ORAL
Qty: 60 TABLET | Refills: 5 | Status: SHIPPED | OUTPATIENT
Start: 2022-05-27 | End: 2022-09-06 | Stop reason: DRUGHIGH

## 2022-05-27 RX ORDER — SERTRALINE HYDROCHLORIDE 100 MG/1
200 TABLET, FILM COATED ORAL DAILY
Qty: 60 TABLET | Refills: 5 | OUTPATIENT
Start: 2022-05-27

## 2022-08-24 ENCOUNTER — SCHEDULED TELEPHONE ENCOUNTER (OUTPATIENT)
Dept: INTERNAL MEDICINE CLINIC | Age: 57
End: 2022-08-24
Payer: COMMERCIAL

## 2022-08-24 ENCOUNTER — TELEPHONE (OUTPATIENT)
Dept: INTERNAL MEDICINE CLINIC | Age: 57
End: 2022-08-24

## 2022-08-24 DIAGNOSIS — F43.10 POSTTRAUMATIC STRESS DISORDER: ICD-10-CM

## 2022-08-24 DIAGNOSIS — F41.9 ANXIETY: ICD-10-CM

## 2022-08-24 DIAGNOSIS — F51.04 PSYCHOPHYSIOLOGIC INSOMNIA: Primary | ICD-10-CM

## 2022-08-24 PROCEDURE — 99443 PR PHYS/QHP TELEPHONE EVALUATION 21-30 MIN: CPT | Performed by: INTERNAL MEDICINE

## 2022-08-24 RX ORDER — PRAZOSIN HYDROCHLORIDE 1 MG/1
1 CAPSULE ORAL NIGHTLY
Qty: 30 CAPSULE | Refills: 3 | Status: SHIPPED | OUTPATIENT
Start: 2022-08-24 | End: 2022-08-25

## 2022-08-24 NOTE — TELEPHONE ENCOUNTER
Patient is calling because he has not slept in 4 days due to his PTSD. He is very frustrated/upset and would like to see what Dr. Tonie Greenberg suggest to help him-very upset. Please call him to discuss.

## 2022-08-25 ENCOUNTER — TELEPHONE (OUTPATIENT)
Dept: INTERNAL MEDICINE CLINIC | Age: 57
End: 2022-08-25

## 2022-08-25 RX ORDER — PRAZOSIN HYDROCHLORIDE 1 MG/1
CAPSULE ORAL
Qty: 90 CAPSULE | Refills: 1 | Status: SHIPPED | OUTPATIENT
Start: 2022-08-25 | End: 2022-09-06 | Stop reason: SDUPTHER

## 2022-08-25 NOTE — PROGRESS NOTES
grandiosity. No bipolar history. Has been stable on seroquel to this point. He is having some homicidal ideation that he has no plans to act on and no means to do so at this time (car is broken). Will work on sleep - add prazosin 1mg nightly and titrate from there. Consider trazodone or other but would need to watch qtc. Will have close follow up - will check on him tomorrow. Will get him hooked into psychiatric care as well. Discussed that should his condition worsen he may need to go to ER or crisis center for evaluation. He has variably worked with psych and therapists in the past.     Total Time: minutes: 21-30 minutes    Cliff Masters was evaluated through a synchronous (real-time) audio encounter. Patient identification was verified at the start of the visit. He (or guardian if applicable) is aware that this is a billable service, which includes applicable co-pays. This visit was conducted with the patient's (and/or legal guardian's) verbal consent. He has not had a related appointment within my department in the past 7 days or scheduled within the next 24 hours. The patient was located at Home: [address] [place] [address]. The provider was located at Home ([address]: [place].     Note: not billable if this call serves to triage the patient into an appointment for the relevant concern    Sydnie Fallon MD

## 2022-09-06 ENCOUNTER — SCHEDULED TELEPHONE ENCOUNTER (OUTPATIENT)
Dept: INTERNAL MEDICINE CLINIC | Age: 57
End: 2022-09-06
Payer: COMMERCIAL

## 2022-09-06 DIAGNOSIS — F43.10 POSTTRAUMATIC STRESS DISORDER: Primary | ICD-10-CM

## 2022-09-06 PROCEDURE — 99442 PR PHYS/QHP TELEPHONE EVALUATION 11-20 MIN: CPT | Performed by: INTERNAL MEDICINE

## 2022-09-06 RX ORDER — QUETIAPINE FUMARATE 300 MG/1
TABLET, FILM COATED ORAL
Qty: 60 TABLET | Refills: 5 | Status: SHIPPED
Start: 2022-09-06 | End: 2022-10-24

## 2022-09-06 RX ORDER — PRAZOSIN HYDROCHLORIDE 1 MG/1
CAPSULE ORAL
Qty: 90 CAPSULE | Refills: 1
Start: 2022-09-06

## 2022-09-06 NOTE — PROGRESS NOTES
Carlos Gayle is a 60 y.o. male evaluated via telephone on 9/6/2022 for Follow-up (Follow up on PTSD )  . Documentation:  I communicated with the patient and/or health care decision maker about PTSD  Details of this discussion including any medical advice provided:     Feeling better now than during his episode 2 weeks ago where he had not slept for 4 days. Feels like he was keeping his anger going because was keeping him going otherwise he felt too tired and depressed to go on. He started the prazosin and it did help him sleep. He did titrate up to 2mg after 3 days as we had discussed. He is no longer having any homicidal ideation and he has no suicidal ideation. He is interested in getting hooked in with psychiatric care as he has been in and out of psychiatric care over time. Current medications -   Seroquel 300mg at bedtime  Sertraline 200mg daily  Wellbutrin 300mg daily  Prazosin 1mg at bedtime added 2 weeks ago. Assess/plan -   1. Posttraumatic stress disorder  Assessment & Plan:  Continue current medications as listed above. Scheduled with psychiatrist in 1 month's time. Orders:  -     prazosin (MINIPRESS) 1 MG capsule; TAKE 2 CAPSULE BY MOUTH EVERY NIGHT, Disp-90 capsule, R-1**Patient requests 90 days supply**Adjust Sig      Total Time: minutes: 11-20 minutes    Carlos Gayle was evaluated through a synchronous (real-time) audio encounter. Patient identification was verified at the start of the visit. He (or guardian if applicable) is aware that this is a billable service, which includes applicable co-pays. This visit was conducted with the patient's (and/or legal guardian's) verbal consent. He has not had a related appointment within my department in the past 7 days or scheduled within the next 24 hours. The patient was located at Home: [address] [place] [address]. The provider was located at Home ([address]: [place].     Note: not billable if this call serves to triage the patient into an appointment for the relevant concern    Jonathan Humphries MD

## 2022-09-22 ENCOUNTER — TELEPHONE (OUTPATIENT)
Dept: INTERNAL MEDICINE CLINIC | Age: 57
End: 2022-09-22

## 2022-09-22 NOTE — TELEPHONE ENCOUNTER
BIENVENIDOM to schedule pt with new psychologist for PTSD    Patinet scheduled with Dr. Warden Frye 10/06 NP- Dr. Jl Garvin would like him to see both

## 2022-09-24 NOTE — TELEPHONE ENCOUNTER
Last appointment: 9/6/2022  Next appointment: due 1/2023  Last refill: both pended medications 3/7/22

## 2022-09-26 RX ORDER — BUPROPION HYDROCHLORIDE 300 MG/1
300 TABLET ORAL EVERY MORNING
Qty: 30 TABLET | Refills: 5 | Status: SHIPPED | OUTPATIENT
Start: 2022-09-26

## 2022-09-26 RX ORDER — SERTRALINE HYDROCHLORIDE 100 MG/1
200 TABLET, FILM COATED ORAL DAILY
Qty: 60 TABLET | Refills: 5 | Status: SHIPPED | OUTPATIENT
Start: 2022-09-26

## 2022-10-12 NOTE — TELEPHONE ENCOUNTER
LVM for patient to call back.   Please schedule with Dr. Georgina Avilez and  Offer to reschedule his missed appt with Dr. Ronaldo Garland

## 2022-10-24 RX ORDER — QUETIAPINE FUMARATE 300 MG/1
TABLET, FILM COATED ORAL
Qty: 60 TABLET | Refills: 5 | Status: SHIPPED | OUTPATIENT
Start: 2022-10-24

## 2023-01-06 DIAGNOSIS — F43.10 POSTTRAUMATIC STRESS DISORDER: ICD-10-CM

## 2023-01-09 RX ORDER — PRAZOSIN HYDROCHLORIDE 1 MG/1
CAPSULE ORAL
Qty: 270 CAPSULE | Refills: 1 | Status: SHIPPED | OUTPATIENT
Start: 2023-01-09

## 2023-04-02 DIAGNOSIS — F43.10 POSTTRAUMATIC STRESS DISORDER: ICD-10-CM

## 2023-04-03 RX ORDER — PRAZOSIN HYDROCHLORIDE 1 MG/1
CAPSULE ORAL
Qty: 90 CAPSULE | Refills: 1 | Status: SHIPPED | OUTPATIENT
Start: 2023-04-03

## 2023-05-01 RX ORDER — BUPROPION HYDROCHLORIDE 300 MG/1
300 TABLET ORAL EVERY MORNING
Qty: 30 TABLET | Refills: 5 | Status: SHIPPED | OUTPATIENT
Start: 2023-05-01

## 2023-05-01 NOTE — TELEPHONE ENCOUNTER
Refill Request     Last Seen: 9/6/2022    Last Written: 9/26/22      Next Appointment:   Future Appointments   Date Time Provider Johanna Luciana   6/22/2023  3:00 PM MD PERCY Schultz             Requested Prescriptions     Pending Prescriptions Disp Refills    buPROPion (WELLBUTRIN XL) 300 MG extended release tablet [Pharmacy Med Name: BUPROPION XL 300MG TABLETS] 30 tablet 5     Sig: TAKE 1 TABLET BY MOUTH EVERY MORNING

## 2023-05-08 RX ORDER — SERTRALINE HYDROCHLORIDE 100 MG/1
200 TABLET, FILM COATED ORAL DAILY
Qty: 60 TABLET | Refills: 2 | Status: SHIPPED | OUTPATIENT
Start: 2023-05-08

## 2023-07-07 RX ORDER — QUETIAPINE FUMARATE 300 MG/1
TABLET, FILM COATED ORAL
Qty: 60 TABLET | Refills: 5 | Status: SHIPPED | OUTPATIENT
Start: 2023-07-07

## 2023-07-31 RX ORDER — SERTRALINE HYDROCHLORIDE 100 MG/1
200 TABLET, FILM COATED ORAL DAILY
Qty: 180 TABLET | OUTPATIENT
Start: 2023-07-31

## 2023-07-31 RX ORDER — SERTRALINE HYDROCHLORIDE 100 MG/1
200 TABLET, FILM COATED ORAL DAILY
Qty: 60 TABLET | Refills: 0 | Status: SHIPPED | OUTPATIENT
Start: 2023-07-31

## 2023-08-30 RX ORDER — SERTRALINE HYDROCHLORIDE 100 MG/1
200 TABLET, FILM COATED ORAL DAILY
Qty: 60 TABLET | Refills: 0 | Status: SHIPPED | OUTPATIENT
Start: 2023-08-30

## 2023-08-30 RX ORDER — SERTRALINE HYDROCHLORIDE 100 MG/1
200 TABLET, FILM COATED ORAL DAILY
Qty: 180 TABLET | OUTPATIENT
Start: 2023-08-30

## 2023-09-12 ENCOUNTER — OFFICE VISIT (OUTPATIENT)
Dept: INTERNAL MEDICINE CLINIC | Age: 58
End: 2023-09-12
Payer: COMMERCIAL

## 2023-09-12 VITALS
DIASTOLIC BLOOD PRESSURE: 72 MMHG | BODY MASS INDEX: 36.82 KG/M2 | HEIGHT: 71 IN | HEART RATE: 96 BPM | WEIGHT: 263 LBS | SYSTOLIC BLOOD PRESSURE: 120 MMHG | OXYGEN SATURATION: 97 %

## 2023-09-12 DIAGNOSIS — Z23 NEED FOR VACCINATION: ICD-10-CM

## 2023-09-12 DIAGNOSIS — F10.21 ALCOHOLISM IN REMISSION (HCC): ICD-10-CM

## 2023-09-12 DIAGNOSIS — F43.10 POSTTRAUMATIC STRESS DISORDER: Primary | ICD-10-CM

## 2023-09-12 DIAGNOSIS — K21.9 GASTROESOPHAGEAL REFLUX DISEASE WITHOUT ESOPHAGITIS: ICD-10-CM

## 2023-09-12 DIAGNOSIS — Z12.11 COLON CANCER SCREENING: ICD-10-CM

## 2023-09-12 PROCEDURE — 99215 OFFICE O/P EST HI 40 MIN: CPT | Performed by: INTERNAL MEDICINE

## 2023-09-12 PROCEDURE — 90739 HEPB VACC 2/4 DOSE ADULT IM: CPT | Performed by: INTERNAL MEDICINE

## 2023-09-12 PROCEDURE — G0009 ADMIN PNEUMOCOCCAL VACCINE: HCPCS | Performed by: INTERNAL MEDICINE

## 2023-09-12 PROCEDURE — 90677 PCV20 VACCINE IM: CPT | Performed by: INTERNAL MEDICINE

## 2023-09-12 PROCEDURE — G0010 ADMIN HEPATITIS B VACCINE: HCPCS | Performed by: INTERNAL MEDICINE

## 2023-09-12 RX ORDER — PRAZOSIN HYDROCHLORIDE 1 MG/1
CAPSULE ORAL
Qty: 270 CAPSULE | Refills: 1 | Status: SHIPPED | OUTPATIENT
Start: 2023-09-12

## 2023-09-12 RX ORDER — OMEPRAZOLE 40 MG/1
40 CAPSULE, DELAYED RELEASE ORAL
Qty: 90 CAPSULE | Refills: 1 | Status: SHIPPED | OUTPATIENT
Start: 2023-09-12

## 2023-09-12 SDOH — ECONOMIC STABILITY: HOUSING INSECURITY
IN THE LAST 12 MONTHS, WAS THERE A TIME WHEN YOU DID NOT HAVE A STEADY PLACE TO SLEEP OR SLEPT IN A SHELTER (INCLUDING NOW)?: NO

## 2023-09-12 SDOH — ECONOMIC STABILITY: FOOD INSECURITY: WITHIN THE PAST 12 MONTHS, THE FOOD YOU BOUGHT JUST DIDN'T LAST AND YOU DIDN'T HAVE MONEY TO GET MORE.: NEVER TRUE

## 2023-09-12 SDOH — ECONOMIC STABILITY: INCOME INSECURITY: HOW HARD IS IT FOR YOU TO PAY FOR THE VERY BASICS LIKE FOOD, HOUSING, MEDICAL CARE, AND HEATING?: NOT HARD AT ALL

## 2023-09-12 SDOH — ECONOMIC STABILITY: FOOD INSECURITY: WITHIN THE PAST 12 MONTHS, YOU WORRIED THAT YOUR FOOD WOULD RUN OUT BEFORE YOU GOT MONEY TO BUY MORE.: NEVER TRUE

## 2023-09-12 ASSESSMENT — PATIENT HEALTH QUESTIONNAIRE - PHQ9
4. FEELING TIRED OR HAVING LITTLE ENERGY: 1
7. TROUBLE CONCENTRATING ON THINGS, SUCH AS READING THE NEWSPAPER OR WATCHING TELEVISION: 1
SUM OF ALL RESPONSES TO PHQ QUESTIONS 1-9: 10
SUM OF ALL RESPONSES TO PHQ QUESTIONS 1-9: 10
3. TROUBLE FALLING OR STAYING ASLEEP: 1
SUM OF ALL RESPONSES TO PHQ QUESTIONS 1-9: 10
SUM OF ALL RESPONSES TO PHQ QUESTIONS 1-9: 10
5. POOR APPETITE OR OVEREATING: 2
6. FEELING BAD ABOUT YOURSELF - OR THAT YOU ARE A FAILURE OR HAVE LET YOURSELF OR YOUR FAMILY DOWN: 2
2. FEELING DOWN, DEPRESSED OR HOPELESS: 1
8. MOVING OR SPEAKING SO SLOWLY THAT OTHER PEOPLE COULD HAVE NOTICED. OR THE OPPOSITE, BEING SO FIGETY OR RESTLESS THAT YOU HAVE BEEN MOVING AROUND A LOT MORE THAN USUAL: 1
1. LITTLE INTEREST OR PLEASURE IN DOING THINGS: 1
SUM OF ALL RESPONSES TO PHQ9 QUESTIONS 1 & 2: 2
9. THOUGHTS THAT YOU WOULD BE BETTER OFF DEAD, OR OF HURTING YOURSELF: 0
10. IF YOU CHECKED OFF ANY PROBLEMS, HOW DIFFICULT HAVE THESE PROBLEMS MADE IT FOR YOU TO DO YOUR WORK, TAKE CARE OF THINGS AT HOME, OR GET ALONG WITH OTHER PEOPLE: 1

## 2023-09-12 NOTE — PROGRESS NOTES
Margaretta Felty (:  1965) is a 59 y.o. male,Established patient, here for evaluation of the following chief complaint(s):  Depression (Follow up )      ASSESSMENT/PLAN:  1. Posttraumatic stress disorder  Assessment & Plan:  Last year I recommended he see the psychiatrist but doing this was a big burden for him and he was unable to do it. He did manage through that crisis and he is now back on prazosin and up to 3mg which has helped his sleep. He is currently isolating more and is working on ways to get out of the house more often. We discussed this at length today. I encouraged him to find a new therapist which he may or may not do. He does plan to continue efforts at healthy diet and exercise which he knows help him. Support provided today. Continue current meds - wellbutrin XL 300mg daily, seroqual 300mg BID, sertraline 200mg daily and prazosin 3mg nightly. Orders:  -     prazosin (MINIPRESS) 1 MG capsule; TAKE 3 CAPSULE BY MOUTH EVERY NIGHT, Disp-270 capsule, R-1Normal  2. Gastroesophageal reflux disease without esophagitis  Assessment & Plan:  Some worsening with recent weight gain. Will increase dose to omeprazole 40mg daily. Will work on dietary changes. Orders:  -     omeprazole (PRILOSEC) 40 MG delayed release capsule; Take 1 capsule by mouth every morning (before breakfast), Disp-90 capsule, R-1Normal  3. Need for vaccination  -     Pneumococcal, PCV20, PREVNAR 21, (age 25 yrs+), IM, PF  -     Hep B, HEPLISAV-B, (age 25 yrs+), IM, 0.5mL, 2-Dose  4. Colon cancer screening  -     Fecal DNA Colorectal cancer screening (Cologuard)  5. Alcoholism in remission [facility])  Assessment & Plan:   Well-controlled, continue current treatment plan. Encouraged him to return to [address] [place] [address]. Support provided today.        Return in about 6 months (around 3/12/2024) for Physical.    SUBJECTIVE/OBJECTIVE:  HPI    His mood is okay and he has been sleeping relatively well  He has been isolating himself however and when he
No

## 2023-09-13 NOTE — ASSESSMENT & PLAN NOTE
Well-controlled, continue current treatment plan. Encouraged him to return to [address] [place] [address]. Support provided today.

## 2023-09-13 NOTE — ASSESSMENT & PLAN NOTE
Some worsening with recent weight gain. Will increase dose to omeprazole 40mg daily. Will work on dietary changes.

## 2023-09-29 RX ORDER — SERTRALINE HYDROCHLORIDE 100 MG/1
200 TABLET, FILM COATED ORAL DAILY
Qty: 60 TABLET | Refills: 5 | Status: SHIPPED | OUTPATIENT
Start: 2023-09-29

## 2023-10-13 ENCOUNTER — PATIENT MESSAGE (OUTPATIENT)
Dept: INTERNAL MEDICINE CLINIC | Age: 58
End: 2023-10-13

## 2023-10-13 DIAGNOSIS — R19.5 POSITIVE COLORECTAL CANCER SCREENING USING COLOGUARD TEST: Primary | ICD-10-CM

## 2023-10-13 LAB — NONINV COLON CA DNA+OCC BLD SCRN STL QL: POSITIVE

## 2023-11-01 RX ORDER — BUPROPION HYDROCHLORIDE 300 MG/1
300 TABLET ORAL EVERY MORNING
Qty: 30 TABLET | Refills: 5 | Status: SHIPPED | OUTPATIENT
Start: 2023-11-01

## 2023-12-13 ENCOUNTER — OFFICE VISIT (OUTPATIENT)
Dept: FAMILY MEDICINE CLINIC | Age: 58
End: 2023-12-13
Payer: COMMERCIAL

## 2023-12-13 VITALS
HEART RATE: 100 BPM | SYSTOLIC BLOOD PRESSURE: 122 MMHG | HEIGHT: 71 IN | BODY MASS INDEX: 37.94 KG/M2 | TEMPERATURE: 98.9 F | DIASTOLIC BLOOD PRESSURE: 72 MMHG | WEIGHT: 271 LBS | OXYGEN SATURATION: 97 %

## 2023-12-13 DIAGNOSIS — J02.0 ACUTE STREPTOCOCCAL PHARYNGITIS: Primary | ICD-10-CM

## 2023-12-13 LAB — S PYO AG THROAT QL: POSITIVE

## 2023-12-13 PROCEDURE — 87880 STREP A ASSAY W/OPTIC: CPT | Performed by: NURSE PRACTITIONER

## 2023-12-13 PROCEDURE — 99213 OFFICE O/P EST LOW 20 MIN: CPT | Performed by: NURSE PRACTITIONER

## 2023-12-13 RX ORDER — AMOXICILLIN AND CLAVULANATE POTASSIUM 875; 125 MG/1; MG/1
1 TABLET, FILM COATED ORAL 2 TIMES DAILY
Qty: 20 TABLET | Refills: 0 | Status: SHIPPED | OUTPATIENT
Start: 2023-12-13 | End: 2023-12-23

## 2023-12-13 ASSESSMENT — ENCOUNTER SYMPTOMS
COUGH: 0
ABDOMINAL PAIN: 0
VOMITING: 0
SHORTNESS OF BREATH: 0
SORE THROAT: 1
SWOLLEN GLANDS: 1
BACK PAIN: 0
COLOR CHANGE: 0
CONSTIPATION: 0
WHEEZING: 0
SINUS PAIN: 0
DIARRHEA: 0
SINUS PRESSURE: 0

## 2023-12-13 NOTE — PROGRESS NOTES
Lisseth Barnes (:  1965) is a 59 y.o. male,Established patient, here for evaluation of the following chief complaint(s):  Pharyngitis (Has been present for ~3 days)      ASSESSMENT/PLAN:  1. Acute streptococcal pharyngitis  Assessment & Plan:  Positive strep   Start amoxicillin   Tylenol/ NSAIDs prn   Call if no better   Orders:  -     POCT rapid strep A      No follow-ups on file. SUBJECTIVE/OBJECTIVE:  Pharyngitis  This is a new problem. The current episode started in the past 7 days. The problem occurs constantly. The problem has been gradually worsening. Associated symptoms include congestion, fatigue, a fever, headaches, a sore throat and swollen glands. Pertinent negatives include no abdominal pain, arthralgias, chest pain, chills, coughing, myalgias, rash, vomiting or weakness. The symptoms are aggravated by swallowing. He has tried acetaminophen and NSAIDs for the symptoms. The treatment provided no relief. Current Outpatient Medications   Medication Sig Dispense Refill    amoxicillin-clavulanate (AUGMENTIN) 875-125 MG per tablet Take 1 tablet by mouth 2 times daily for 10 days 20 tablet 0    buPROPion (WELLBUTRIN XL) 300 MG extended release tablet TAKE 1 TABLET BY MOUTH EVERY MORNING 30 tablet 5    sertraline (ZOLOFT) 100 MG tablet TAKE 2 TABLETS BY MOUTH DAILY 60 tablet 5    prazosin (MINIPRESS) 1 MG capsule TAKE 3 CAPSULE BY MOUTH EVERY NIGHT 270 capsule 1    omeprazole (PRILOSEC) 40 MG delayed release capsule Take 1 capsule by mouth every morning (before breakfast) 90 capsule 1    QUEtiapine (SEROQUEL) 300 MG tablet TAKE 1 TABLET BY MOUTH TWICE DAILY 60 tablet 5    metFORMIN (GLUCOPHAGE) 850 MG tablet Take 1 tablet by mouth daily (with breakfast) 90 tablet 1     No current facility-administered medications for this visit. Review of Systems   Constitutional:  Positive for fatigue and fever. Negative for chills. HENT:  Positive for congestion and sore throat.  Negative for sinus

## 2023-12-14 DIAGNOSIS — K21.9 GASTROESOPHAGEAL REFLUX DISEASE WITHOUT ESOPHAGITIS: ICD-10-CM

## 2023-12-14 RX ORDER — OMEPRAZOLE 40 MG/1
40 CAPSULE, DELAYED RELEASE ORAL
Qty: 90 CAPSULE | Refills: 1 | Status: SHIPPED | OUTPATIENT
Start: 2023-12-14

## 2024-03-29 RX ORDER — SERTRALINE HYDROCHLORIDE 100 MG/1
200 TABLET, FILM COATED ORAL DAILY
Qty: 60 TABLET | Refills: 5 | Status: SHIPPED | OUTPATIENT
Start: 2024-03-29

## 2024-03-29 NOTE — TELEPHONE ENCOUNTER
Medication:   Requested Prescriptions     Pending Prescriptions Disp Refills    sertraline (ZOLOFT) 100 MG tablet [Pharmacy Med Name: SERTRALINE 100MG TABLETS] 60 tablet 5     Sig: TAKE 2 TABLETS BY MOUTH DAILY     Last Filled:  # 60 with 5 refills on 9/29/23    Last appt: 9/12/2023   Next appt: 3/29/2024    Last OARRS:        No data to display

## 2024-03-29 NOTE — TELEPHONE ENCOUNTER
Medication:   Requested Prescriptions     Pending Prescriptions Disp Refills    sertraline (ZOLOFT) 100 MG tablet [Pharmacy Med Name: SERTRALINE 100MG TABLETS] 60 tablet 5     Sig: TAKE 2 TABLETS BY MOUTH DAILY     Last Filled:  # 60 with 5 refills on 9/29/23    Last appt: 9/12/2023   Next appt: Visit date not found    Last OARRS:        No data to display

## 2024-08-07 ENCOUNTER — OFFICE VISIT (OUTPATIENT)
Dept: FAMILY MEDICINE CLINIC | Age: 59
End: 2024-08-07
Payer: COMMERCIAL

## 2024-08-07 VITALS
WEIGHT: 256.6 LBS | RESPIRATION RATE: 14 BRPM | BODY MASS INDEX: 35.79 KG/M2 | DIASTOLIC BLOOD PRESSURE: 64 MMHG | HEART RATE: 87 BPM | OXYGEN SATURATION: 97 % | SYSTOLIC BLOOD PRESSURE: 108 MMHG | TEMPERATURE: 97.5 F

## 2024-08-07 DIAGNOSIS — Z00.00 ROUTINE GENERAL MEDICAL EXAMINATION AT A HEALTH CARE FACILITY: Primary | ICD-10-CM

## 2024-08-07 DIAGNOSIS — K21.9 GASTROESOPHAGEAL REFLUX DISEASE WITHOUT ESOPHAGITIS: ICD-10-CM

## 2024-08-07 DIAGNOSIS — F33.41 RECURRENT MAJOR DEPRESSIVE DISORDER, IN PARTIAL REMISSION (HCC): ICD-10-CM

## 2024-08-07 DIAGNOSIS — F43.10 POSTTRAUMATIC STRESS DISORDER: ICD-10-CM

## 2024-08-07 DIAGNOSIS — R19.5 POSITIVE FIT (FECAL IMMUNOCHEMICAL TEST): ICD-10-CM

## 2024-08-07 PROCEDURE — 99214 OFFICE O/P EST MOD 30 MIN: CPT | Performed by: NURSE PRACTITIONER

## 2024-08-07 PROCEDURE — G2211 COMPLEX E/M VISIT ADD ON: HCPCS | Performed by: NURSE PRACTITIONER

## 2024-08-07 RX ORDER — BUPROPION HYDROCHLORIDE 300 MG/1
300 TABLET ORAL EVERY MORNING
Qty: 30 TABLET | Refills: 5 | Status: SHIPPED | OUTPATIENT
Start: 2024-08-07

## 2024-08-07 RX ORDER — PRAZOSIN HYDROCHLORIDE 1 MG/1
CAPSULE ORAL
Qty: 270 CAPSULE | Refills: 1 | Status: SHIPPED | OUTPATIENT
Start: 2024-08-07

## 2024-08-07 RX ORDER — QUETIAPINE FUMARATE 300 MG/1
300 TABLET, FILM COATED ORAL 2 TIMES DAILY
Qty: 60 TABLET | Refills: 5 | Status: SHIPPED | OUTPATIENT
Start: 2024-08-07

## 2024-08-07 RX ORDER — SERTRALINE HYDROCHLORIDE 100 MG/1
200 TABLET, FILM COATED ORAL DAILY
Qty: 60 TABLET | Refills: 5 | Status: SHIPPED | OUTPATIENT
Start: 2024-08-07

## 2024-08-07 RX ORDER — OMEPRAZOLE 40 MG/1
40 CAPSULE, DELAYED RELEASE ORAL
Qty: 90 CAPSULE | Refills: 1 | Status: SHIPPED | OUTPATIENT
Start: 2024-08-07

## 2024-08-07 SDOH — HEALTH STABILITY: PHYSICAL HEALTH: ON AVERAGE, HOW MANY MINUTES DO YOU ENGAGE IN EXERCISE AT THIS LEVEL?: 30 MIN

## 2024-08-07 SDOH — HEALTH STABILITY: PHYSICAL HEALTH: ON AVERAGE, HOW MANY DAYS PER WEEK DO YOU ENGAGE IN MODERATE TO STRENUOUS EXERCISE (LIKE A BRISK WALK)?: 1 DAY

## 2024-08-07 ASSESSMENT — ENCOUNTER SYMPTOMS
EYE REDNESS: 0
COUGH: 0
CHEST TIGHTNESS: 0
VOMITING: 0
COLOR CHANGE: 0
SINUS PRESSURE: 0
EYE ITCHING: 0
SHORTNESS OF BREATH: 0
DIARRHEA: 0
NAUSEA: 0
WHEEZING: 0
BLOOD IN STOOL: 0
BACK PAIN: 0
ABDOMINAL PAIN: 0
CONSTIPATION: 0
SORE THROAT: 0
RHINORRHEA: 0

## 2024-08-07 ASSESSMENT — PATIENT HEALTH QUESTIONNAIRE - PHQ9
8. MOVING OR SPEAKING SO SLOWLY THAT OTHER PEOPLE COULD HAVE NOTICED. OR THE OPPOSITE, BEING SO FIGETY OR RESTLESS THAT YOU HAVE BEEN MOVING AROUND A LOT MORE THAN USUAL: NEARLY EVERY DAY
5. POOR APPETITE OR OVEREATING: NEARLY EVERY DAY
10. IF YOU CHECKED OFF ANY PROBLEMS, HOW DIFFICULT HAVE THESE PROBLEMS MADE IT FOR YOU TO DO YOUR WORK, TAKE CARE OF THINGS AT HOME, OR GET ALONG WITH OTHER PEOPLE: VERY DIFFICULT
6. FEELING BAD ABOUT YOURSELF - OR THAT YOU ARE A FAILURE OR HAVE LET YOURSELF OR YOUR FAMILY DOWN: NEARLY EVERY DAY
1. LITTLE INTEREST OR PLEASURE IN DOING THINGS: NEARLY EVERY DAY
SUM OF ALL RESPONSES TO PHQ QUESTIONS 1-9: 24
SUM OF ALL RESPONSES TO PHQ QUESTIONS 1-9: 24
7. TROUBLE CONCENTRATING ON THINGS, SUCH AS READING THE NEWSPAPER OR WATCHING TELEVISION: NEARLY EVERY DAY
4. FEELING TIRED OR HAVING LITTLE ENERGY: NEARLY EVERY DAY
2. FEELING DOWN, DEPRESSED OR HOPELESS: NEARLY EVERY DAY
SUM OF ALL RESPONSES TO PHQ QUESTIONS 1-9: 24
SUM OF ALL RESPONSES TO PHQ9 QUESTIONS 1 & 2: 6
SUM OF ALL RESPONSES TO PHQ QUESTIONS 1-9: 24
9. THOUGHTS THAT YOU WOULD BE BETTER OFF DEAD, OR OF HURTING YOURSELF: NOT AT ALL
3. TROUBLE FALLING OR STAYING ASLEEP: NEARLY EVERY DAY

## 2024-08-07 ASSESSMENT — COLUMBIA-SUICIDE SEVERITY RATING SCALE - C-SSRS
6. HAVE YOU EVER DONE ANYTHING, STARTED TO DO ANYTHING, OR PREPARED TO DO ANYTHING TO END YOUR LIFE?: NO
1. WITHIN THE PAST MONTH, HAVE YOU WISHED YOU WERE DEAD OR WISHED YOU COULD GO TO SLEEP AND NOT WAKE UP?: NO
2. HAVE YOU ACTUALLY HAD ANY THOUGHTS OF KILLING YOURSELF?: NO

## 2024-08-07 NOTE — ASSESSMENT & PLAN NOTE
Patient had a positive fit test last fall.  He was advised by his previous primary care physician that he needs to have a colonoscopy however he did not do this.  I have provided him with additional referral today and encouraged him to get this completed.

## 2024-08-07 NOTE — PROGRESS NOTES
Medications   Medication Sig Dispense Refill    buPROPion (WELLBUTRIN XL) 300 MG extended release tablet Take 1 tablet by mouth every morning 30 tablet 5    omeprazole (PRILOSEC) 40 MG delayed release capsule Take 1 capsule by mouth every morning (before breakfast) 90 capsule 1    prazosin (MINIPRESS) 1 MG capsule TAKE 3 CAPSULE BY MOUTH EVERY NIGHT 270 capsule 1    QUEtiapine (SEROQUEL) 300 MG tablet Take 1 tablet by mouth 2 times daily 60 tablet 5    sertraline (ZOLOFT) 100 MG tablet Take 2 tablets by mouth daily 60 tablet 5     No current facility-administered medications for this visit.         Review of Systems   Constitutional:  Negative for chills, fatigue and fever.   HENT:  Negative for congestion, ear pain, postnasal drip, rhinorrhea, sinus pressure, sneezing and sore throat.    Eyes:  Negative for redness and itching.   Respiratory:  Negative for cough, chest tightness, shortness of breath and wheezing.    Cardiovascular:  Negative for chest pain and palpitations.   Gastrointestinal:  Negative for abdominal pain, blood in stool, constipation, diarrhea, nausea and vomiting.   Endocrine: Negative for cold intolerance and heat intolerance.   Genitourinary:  Negative for difficulty urinating, dysuria, flank pain, frequency, hematuria and urgency.   Musculoskeletal:  Negative for arthralgias, back pain, joint swelling and myalgias.   Skin:  Negative for color change, pallor, rash and wound.   Allergic/Immunologic: Negative for environmental allergies and food allergies.   Neurological:  Negative for dizziness, seizures, syncope, weakness, light-headedness, numbness and headaches.   Hematological:  Negative for adenopathy. Does not bruise/bleed easily.   Psychiatric/Behavioral:  Negative for confusion, sleep disturbance and suicidal ideas. The patient is not nervous/anxious and is not hyperactive.        Vitals:    08/07/24 0943   BP: 108/64   Pulse: 87   Resp: 14   Temp: 97.5 °F (36.4 °C)   TempSrc: Temporal

## 2024-08-26 ENCOUNTER — OFFICE VISIT (OUTPATIENT)
Dept: FAMILY MEDICINE CLINIC | Age: 59
End: 2024-08-26
Payer: COMMERCIAL

## 2024-08-26 VITALS
WEIGHT: 256.4 LBS | DIASTOLIC BLOOD PRESSURE: 72 MMHG | TEMPERATURE: 97.2 F | OXYGEN SATURATION: 98 % | BODY MASS INDEX: 35.76 KG/M2 | SYSTOLIC BLOOD PRESSURE: 138 MMHG | RESPIRATION RATE: 14 BRPM | HEART RATE: 103 BPM

## 2024-08-26 DIAGNOSIS — F43.10 POSTTRAUMATIC STRESS DISORDER: Chronic | ICD-10-CM

## 2024-08-26 DIAGNOSIS — R42 DIZZINESS: Primary | ICD-10-CM

## 2024-08-26 PROCEDURE — 36000 PLACE NEEDLE IN VEIN: CPT | Performed by: NURSE PRACTITIONER

## 2024-08-26 PROCEDURE — 99215 OFFICE O/P EST HI 40 MIN: CPT | Performed by: NURSE PRACTITIONER

## 2024-08-26 PROCEDURE — G2211 COMPLEX E/M VISIT ADD ON: HCPCS | Performed by: NURSE PRACTITIONER

## 2024-08-26 PROCEDURE — 93000 ELECTROCARDIOGRAM COMPLETE: CPT | Performed by: NURSE PRACTITIONER

## 2024-08-26 RX ORDER — 0.9 % SODIUM CHLORIDE 0.9 %
1000 INTRAVENOUS SOLUTION INTRAVENOUS ONCE
Status: COMPLETED | OUTPATIENT
Start: 2024-08-26 | End: 2024-08-26

## 2024-08-26 RX ADMIN — Medication 1000 ML: at 11:10

## 2024-08-26 ASSESSMENT — ENCOUNTER SYMPTOMS
RHINORRHEA: 0
CHEST TIGHTNESS: 0
SHORTNESS OF BREATH: 0
NAUSEA: 0
EYE REDNESS: 0
DIARRHEA: 0
EYE ITCHING: 0
COLOR CHANGE: 0
COUGH: 0
BLOOD IN STOOL: 0
SORE THROAT: 0
CONSTIPATION: 0
WHEEZING: 0
SINUS PRESSURE: 0
BACK PAIN: 0
VOMITING: 0
ABDOMINAL PAIN: 0

## 2024-08-26 NOTE — ASSESSMENT & PLAN NOTE
At this time patient does not have a current psychiatrist and he is trying to find 1.  He has been experiencing increased PTSD symptoms and went several days without nourishment or water leading to dehydration and dizziness.  Patient has information on how to contact to establish care with psych.

## 2024-08-26 NOTE — PROGRESS NOTES
Audi Cohen (:  1965) is a 58 y.o. male,Established patient, here for evaluation of the following chief complaint(s):  Dehydration and Dizziness      ASSESSMENT/PLAN:  1. Dizziness  Assessment & Plan:  Patient with dizziness and dehydration as a result of malnourishment.  Patient provided 1 L bolus of normal saline.  Patient tolerated well and symptoms improved upon completion of infusion.  Patient was no longer tachycardic and felt better prior to leaving office.  He still has labs that are outstanding I encouraged him to complete.  His malnourishment is related to struggles with mental health and he is trying to get to a psychiatrist.    Orders:  -     EKG 12 Lead - Clinic Performed  -     sodium chloride 0.9 % bolus 1,000 mL; 1,000 mL (8.6 mL/kg), IntraVENous, at 983.6 mL/hr, Administer over 61 Minutes, ONCE, On Mon 24 at 1130, For 1 dose  2. Posttraumatic stress disorder  Assessment & Plan:  At this time patient does not have a current psychiatrist and he is trying to find 1.  He has been experiencing increased PTSD symptoms and went several days without nourishment or water leading to dehydration and dizziness.  Patient has information on how to contact to establish care with psych.        Return in about 1 month (around 2024).    SUBJECTIVE/OBJECTIVE:    Concerned today about dehydration. He had a couple days recenlty that he did not eat or drink. Feels very dehydrated.  He states that this has happened before.  He states that he experiences struggles with his mental health and stops eating.  He states that he has felt extremely weak and fatigued.  He has labs that were ordered at his previous visit however he has not yet completed these so difficult to say what his baseline is however these are still outstanding.  Current Outpatient Medications   Medication Sig Dispense Refill    buPROPion (WELLBUTRIN XL) 300 MG extended release tablet Take 1 tablet by mouth every morning 30 tablet 5     omeprazole (PRILOSEC) 40 MG delayed release capsule Take 1 capsule by mouth every morning (before breakfast) 90 capsule 1    prazosin (MINIPRESS) 1 MG capsule TAKE 3 CAPSULE BY MOUTH EVERY NIGHT 270 capsule 1    QUEtiapine (SEROQUEL) 300 MG tablet Take 1 tablet by mouth 2 times daily 60 tablet 5    sertraline (ZOLOFT) 100 MG tablet Take 2 tablets by mouth daily 60 tablet 5     No current facility-administered medications for this visit.         Review of Systems   Constitutional:  Positive for fatigue. Negative for chills and fever.   HENT:  Negative for congestion, ear pain, postnasal drip, rhinorrhea, sinus pressure, sneezing and sore throat.    Eyes:  Negative for redness and itching.   Respiratory:  Negative for cough, chest tightness, shortness of breath and wheezing.    Cardiovascular:  Negative for chest pain and palpitations.   Gastrointestinal:  Negative for abdominal pain, blood in stool, constipation, diarrhea, nausea and vomiting.   Endocrine: Negative for cold intolerance and heat intolerance.   Genitourinary:  Negative for difficulty urinating, dysuria, flank pain, frequency, hematuria and urgency.   Musculoskeletal:  Negative for arthralgias, back pain, joint swelling and myalgias.   Skin:  Negative for color change, pallor, rash and wound.   Allergic/Immunologic: Negative for environmental allergies and food allergies.   Neurological:  Negative for dizziness, seizures, syncope, weakness, light-headedness, numbness and headaches.   Hematological:  Negative for adenopathy. Does not bruise/bleed easily.   Psychiatric/Behavioral:  Negative for confusion, sleep disturbance and suicidal ideas. The patient is not nervous/anxious and is not hyperactive.        Vitals:    08/26/24 1038   BP: 138/72   Pulse: (!) 103   Resp: 14   Temp: 97.2 °F (36.2 °C)   TempSrc: Temporal   SpO2: 98%   Weight: 116.3 kg (256 lb 6.4 oz)       Physical Exam  Constitutional:       Appearance: Normal appearance. He is

## 2024-08-26 NOTE — ASSESSMENT & PLAN NOTE
Patient with dizziness and dehydration as a result of malnourishment.  Patient provided 1 L bolus of normal saline.  Patient tolerated well and symptoms improved upon completion of infusion.  Patient was no longer tachycardic and felt better prior to leaving office.  He still has labs that are outstanding I encouraged him to complete.  His malnourishment is related to struggles with mental health and he is trying to get to a psychiatrist.

## 2024-09-03 ENCOUNTER — HOSPITAL ENCOUNTER (EMERGENCY)
Age: 59
Discharge: HOME | End: 2024-09-03
Attending: EMERGENCY MEDICINE
Payer: COMMERCIAL

## 2024-09-03 ENCOUNTER — APPOINTMENT (OUTPATIENT)
Dept: CT IMAGING | Age: 59
End: 2024-09-03
Payer: COMMERCIAL

## 2024-09-03 VITALS
OXYGEN SATURATION: 98 % | TEMPERATURE: 98.4 F | SYSTOLIC BLOOD PRESSURE: 108 MMHG | BODY MASS INDEX: 33.43 KG/M2 | DIASTOLIC BLOOD PRESSURE: 89 MMHG | HEART RATE: 91 BPM | WEIGHT: 238.8 LBS | HEIGHT: 71 IN | RESPIRATION RATE: 16 BRPM

## 2024-09-03 DIAGNOSIS — R42 DIZZINESS: Primary | ICD-10-CM

## 2024-09-03 LAB
25(OH)D3 SERPL-MCNC: 23.9 NG/ML
ALBUMIN SERPL-MCNC: 4.5 G/DL (ref 3.4–5)
ALBUMIN/GLOB SERPL: 1.6 {RATIO} (ref 1.1–2.2)
ALP SERPL-CCNC: 81 U/L (ref 40–129)
ALT SERPL-CCNC: 31 U/L (ref 10–40)
ANION GAP SERPL CALCULATED.3IONS-SCNC: 13 MMOL/L (ref 3–16)
AST SERPL-CCNC: 23 U/L (ref 15–37)
BACTERIA URNS QL MICRO: ABNORMAL /HPF
BASOPHILS # BLD: 0 K/UL (ref 0–0.2)
BASOPHILS NFR BLD: 0.8 %
BILIRUB SERPL-MCNC: 0.5 MG/DL (ref 0–1)
BILIRUB UR QL STRIP.AUTO: NEGATIVE
BUN SERPL-MCNC: 12 MG/DL (ref 7–20)
CALCIUM SERPL-MCNC: 9.3 MG/DL (ref 8.3–10.6)
CHLORIDE SERPL-SCNC: 93 MMOL/L (ref 99–110)
CLARITY UR: CLEAR
CO2 SERPL-SCNC: 24 MMOL/L (ref 21–32)
COLOR UR: YELLOW
CREAT SERPL-MCNC: 0.9 MG/DL (ref 0.8–1.3)
DEPRECATED RDW RBC AUTO: 13.7 % (ref 12.4–15.4)
EKG ATRIAL RATE: 102 BPM
EKG DIAGNOSIS: NORMAL
EKG P AXIS: 34 DEGREES
EKG P-R INTERVAL: 180 MS
EKG Q-T INTERVAL: 342 MS
EKG QRS DURATION: 94 MS
EKG QTC CALCULATION (BAZETT): 445 MS
EKG R AXIS: 6 DEGREES
EKG T AXIS: 21 DEGREES
EKG VENTRICULAR RATE: 102 BPM
EOSINOPHIL # BLD: 0.2 K/UL (ref 0–0.6)
EOSINOPHIL NFR BLD: 3.4 %
GFR SERPLBLD CREATININE-BSD FMLA CKD-EPI: >90 ML/MIN/{1.73_M2}
GLUCOSE SERPL-MCNC: 116 MG/DL (ref 70–99)
GLUCOSE UR STRIP.AUTO-MCNC: NEGATIVE MG/DL
HCT VFR BLD AUTO: 41.7 % (ref 40.5–52.5)
HGB BLD-MCNC: 14.3 G/DL (ref 13.5–17.5)
HGB UR QL STRIP.AUTO: NEGATIVE
KETONES UR STRIP.AUTO-MCNC: NEGATIVE MG/DL
LEUKOCYTE ESTERASE UR QL STRIP.AUTO: NEGATIVE
LYMPHOCYTES # BLD: 1.4 K/UL (ref 1–5.1)
LYMPHOCYTES NFR BLD: 22.3 %
MCH RBC QN AUTO: 29.5 PG (ref 26–34)
MCHC RBC AUTO-ENTMCNC: 34.3 G/DL (ref 31–36)
MCV RBC AUTO: 86 FL (ref 80–100)
MONOCYTES # BLD: 0.5 K/UL (ref 0–1.3)
MONOCYTES NFR BLD: 7.3 %
NEUTROPHILS # BLD: 4.1 K/UL (ref 1.7–7.7)
NEUTROPHILS NFR BLD: 66.2 %
NITRITE UR QL STRIP.AUTO: NEGATIVE
PH UR STRIP.AUTO: 6.5 [PH] (ref 5–8)
PLATELET # BLD AUTO: 182 K/UL (ref 135–450)
PMV BLD AUTO: 8.8 FL (ref 5–10.5)
POTASSIUM SERPL-SCNC: 4 MMOL/L (ref 3.5–5.1)
PROT SERPL-MCNC: 7.3 G/DL (ref 6.4–8.2)
PROT UR STRIP.AUTO-MCNC: NEGATIVE MG/DL
RBC # BLD AUTO: 4.85 M/UL (ref 4.2–5.9)
RBC #/AREA URNS HPF: ABNORMAL /HPF (ref 0–4)
SODIUM SERPL-SCNC: 130 MMOL/L (ref 136–145)
SP GR UR STRIP.AUTO: <=1.005 (ref 1–1.03)
TROPONIN, HIGH SENSITIVITY: 9 NG/L (ref 0–22)
TSH SERPL DL<=0.005 MIU/L-ACNC: 2.5 UIU/ML (ref 0.27–4.2)
UA DIPSTICK W REFLEX MICRO PNL UR: ABNORMAL
URN SPEC COLLECT METH UR: ABNORMAL
UROBILINOGEN UR STRIP-ACNC: 0.2 E.U./DL
WBC # BLD AUTO: 6.1 K/UL (ref 4–11)
WBC #/AREA URNS HPF: ABNORMAL /HPF (ref 0–5)

## 2024-09-03 PROCEDURE — 84439 ASSAY OF FREE THYROXINE: CPT

## 2024-09-03 PROCEDURE — 99284 EMERGENCY DEPT VISIT MOD MDM: CPT

## 2024-09-03 PROCEDURE — 93005 ELECTROCARDIOGRAM TRACING: CPT | Performed by: EMERGENCY MEDICINE

## 2024-09-03 PROCEDURE — 70450 CT HEAD/BRAIN W/O DYE: CPT

## 2024-09-03 PROCEDURE — 81001 URINALYSIS AUTO W/SCOPE: CPT

## 2024-09-03 PROCEDURE — 96360 HYDRATION IV INFUSION INIT: CPT

## 2024-09-03 PROCEDURE — 80053 COMPREHEN METABOLIC PANEL: CPT

## 2024-09-03 PROCEDURE — 2580000003 HC RX 258: Performed by: NURSE PRACTITIONER

## 2024-09-03 PROCEDURE — 84153 ASSAY OF PSA TOTAL: CPT

## 2024-09-03 PROCEDURE — 80061 LIPID PANEL: CPT

## 2024-09-03 PROCEDURE — 84484 ASSAY OF TROPONIN QUANT: CPT

## 2024-09-03 PROCEDURE — 82306 VITAMIN D 25 HYDROXY: CPT

## 2024-09-03 PROCEDURE — 84443 ASSAY THYROID STIM HORMONE: CPT

## 2024-09-03 PROCEDURE — 85025 COMPLETE CBC W/AUTO DIFF WBC: CPT

## 2024-09-03 RX ORDER — SODIUM CHLORIDE, SODIUM LACTATE, POTASSIUM CHLORIDE, AND CALCIUM CHLORIDE .6; .31; .03; .02 G/100ML; G/100ML; G/100ML; G/100ML
1000 INJECTION, SOLUTION INTRAVENOUS ONCE
Status: COMPLETED | OUTPATIENT
Start: 2024-09-03 | End: 2024-09-03

## 2024-09-03 RX ADMIN — SODIUM CHLORIDE, POTASSIUM CHLORIDE, SODIUM LACTATE AND CALCIUM CHLORIDE 1000 ML: 600; 310; 30; 20 INJECTION, SOLUTION INTRAVENOUS at 09:50

## 2024-09-03 ASSESSMENT — PAIN - FUNCTIONAL ASSESSMENT: PAIN_FUNCTIONAL_ASSESSMENT: NONE - DENIES PAIN

## 2024-09-03 NOTE — ED PROVIDER NOTES
9/3/24     This patient was seen by the advanced practice provider. I have seen and examined the patient, agree with the workup, evaluation, management and diagnosis. Care plan has been discussed.      I have reviewed the ECG and concur.       Assessment reveals 58 y.o. male in ED with complaint of dizziness and dehydration.  Patient states that his had the symptoms for over 10 days.  He states that he feels dehydrated because his lips and hands are dry.  Patient states that he has been drinking plenty of water for the past week.  On my examination he is in no acute distress.  Patient is seen ambulating well to the restroom.  He has no nystagmus or limb ataxia.  Patient has a negative Romberg on my examination.  Heart is regular rate and rhythm and lungs are clear to auscultation bilaterally.  He has good capillary refill and pulses distally.  Abdomen is soft and nontender with bowel sounds present.    Gil Mak MD  Emergency Medicine  [facility]     Gil Mak MD  09/03/24 8315    
Medication List as of 9/3/2024 12:31 PM          PATIENT REFERRED TO:  [facility] Emergency Department  [address] [place] [address] [id number] [phone]    As needed, If symptoms worsen    Jeanie Enrique, MC - CNP  [address] [address]  [phone]            MC Monroy CNP, CNP  Department of Emergency Medicine     Nancy Brown APRN - CNP  09/03/24 6647

## 2024-09-03 NOTE — DISCHARGE INSTRUCTIONS
Your testing today was generally reassuring.  Please do more than 8 to 10 glasses of water per day.  Clinically, you had no evidence of dehydration.  Please follow back up with your primary care provider.  If you are still experiencing dizziness, you may benefit from physical and Occupational Therapy evaluation.  You can speak with your primary care doctor about this.  Please return to the emergency department with new or worsening symptoms including passing out, chest pain, shortness of breath, palpitations or other concerning complaints   You can access the FollowMyHealth Patient Portal offered by [facility] by registering at the following website: [email or web address]. By joining [employer]’s FollowMyHealth portal, you will also be able to view your health information using other applications (apps) compatible with our system.

## 2024-09-04 LAB
CHOLEST SERPL-MCNC: 191 MG/DL (ref 0–199)
HDLC SERPL-MCNC: 46 MG/DL (ref 40–60)
LDL CHOLESTEROL: 93 MG/DL
PSA SERPL DL<=0.01 NG/ML-MCNC: 0.39 NG/ML (ref 0–4)
T4 FREE SERPL-MCNC: 0.9 NG/DL (ref 0.9–1.8)
TRIGL SERPL-MCNC: 258 MG/DL (ref 0–150)
VLDLC SERPL CALC-MCNC: 52 MG/DL

## 2024-11-01 NOTE — TELEPHONE ENCOUNTER
H&P reviewed. The patient was examined and there are no changes to the H&P.         Last appointment: 1/17/2022  Next appointment: Visit date not found  Last refill: 6/25/21

## 2024-11-10 RX ORDER — BUPROPION HYDROCHLORIDE 300 MG/1
300 TABLET ORAL EVERY MORNING
Qty: 90 TABLET | Refills: 0 | Status: SHIPPED | OUTPATIENT
Start: 2024-11-10

## 2024-11-14 PROBLEM — M35.00 SICCA COMPLEX: Status: ACTIVE | Noted: 2024-11-14

## 2024-12-03 NOTE — ASSESSMENT & PLAN NOTE
Last year I recommended he see the psychiatrist but doing this was a big burden for him and he was unable to do it. He did manage through that crisis and he is now back on prazosin and up to 3mg which has helped his sleep. He is currently isolating more and is working on ways to get out of the house more often. We discussed this at length today. I encouraged him to find a new therapist which he may or may not do. He does plan to continue efforts at healthy diet and exercise which he knows help him. Support provided today. Continue current meds - wellbutrin XL 300mg daily, seroqual 300mg BID, sertraline 200mg daily and prazosin 3mg nightly. Please call patient's psychiatrist, Ata Tolliver with [facility], [phone].  I am currently working with Prakash to gradually decrease use of gabapentin.  Has been using gabapentin for some time.  Prakash states psychiatrist is going to wean off of clonazepam at the same time as is weaning off of gabapentin.  Would recommend completing gabapentin wean prior to weaning off clonazepam.  Much concern for relapse/return to misuse if anxiety is uncontrolled during this time.    Becki Avery NP-C

## 2025-03-03 RX ORDER — BUPROPION HYDROCHLORIDE 300 MG/1
300 TABLET ORAL EVERY MORNING
Qty: 90 TABLET | Refills: 0 | Status: SHIPPED | OUTPATIENT
Start: 2025-03-03

## 2025-03-03 NOTE — TELEPHONE ENCOUNTER
Medication:   Requested Prescriptions     Pending Prescriptions Disp Refills    buPROPion (WELLBUTRIN XL) 300 MG extended release tablet [Pharmacy Med Name: BUPROPION XL 300MG TABLETS] 90 tablet 0     Sig: TAKE 1 TABLET BY MOUTH EVERY MORNING        Last Filled:      Patient Phone Number: [phone] (home)     Last appt: 8/26/2024   Next appt: Visit date not found    Last OARRS:        No data to display

## 2025-06-16 RX ORDER — QUETIAPINE FUMARATE 300 MG/1
300 TABLET, FILM COATED ORAL 2 TIMES DAILY
Qty: 60 TABLET | Refills: 5 | Status: SHIPPED | OUTPATIENT
Start: 2025-06-16

## 2025-06-22 ENCOUNTER — HOSPITAL ENCOUNTER (EMERGENCY)
Age: 60
Discharge: HOME | End: 2025-06-22
Payer: COMMERCIAL

## 2025-06-22 VITALS
BODY MASS INDEX: 35 KG/M2 | HEIGHT: 71 IN | OXYGEN SATURATION: 97 % | TEMPERATURE: 98.2 F | SYSTOLIC BLOOD PRESSURE: 134 MMHG | WEIGHT: 250 LBS | DIASTOLIC BLOOD PRESSURE: 88 MMHG | RESPIRATION RATE: 18 BRPM | HEART RATE: 89 BPM

## 2025-06-22 DIAGNOSIS — K04.7 DENTAL INFECTION: ICD-10-CM

## 2025-06-22 DIAGNOSIS — K08.89 PAIN, DENTAL: Primary | ICD-10-CM

## 2025-06-22 PROCEDURE — 99283 EMERGENCY DEPT VISIT LOW MDM: CPT

## 2025-06-22 RX ORDER — CHLORHEXIDINE GLUCONATE ORAL RINSE 1.2 MG/ML
15 SOLUTION DENTAL 2 TIMES DAILY
Qty: 420 ML | Refills: 0 | Status: SHIPPED | OUTPATIENT
Start: 2025-06-22 | End: 2025-07-06

## 2025-06-22 ASSESSMENT — PAIN DESCRIPTION - FREQUENCY: FREQUENCY: CONTINUOUS

## 2025-06-22 ASSESSMENT — PAIN SCALES - GENERAL: PAINLEVEL_OUTOF10: 3

## 2025-06-22 ASSESSMENT — PAIN DESCRIPTION - PAIN TYPE: TYPE: ACUTE PAIN

## 2025-06-22 ASSESSMENT — PAIN DESCRIPTION - DESCRIPTORS: DESCRIPTORS: ACHING

## 2025-06-22 ASSESSMENT — PAIN - FUNCTIONAL ASSESSMENT: PAIN_FUNCTIONAL_ASSESSMENT: 0-10

## 2025-06-22 ASSESSMENT — PAIN DESCRIPTION - ORIENTATION: ORIENTATION: LEFT;POSTERIOR

## 2025-06-22 ASSESSMENT — PAIN DESCRIPTION - ONSET: ONSET: PROGRESSIVE

## 2025-06-22 ASSESSMENT — PAIN DESCRIPTION - LOCATION: LOCATION: TEETH

## 2025-06-22 NOTE — ED PROVIDER NOTES
THE [facility]  EMERGENCY DEPARTMENT ENCOUNTER          PHYSICIAN ASSISTANT NOTE       Date of evaluation: 6/22/2025    Chief Complaint     Dental Pain (2 teeth bothering patient, left lower back, left upper back tooth x 4 days- Concerned for infection)      History of Present Illness     Audi Cohen is a 59 y.o. male who presents emergency department with a chief complaint of dental pain.  The patient states that he has multiple fillings.  He states that he felt like he fractured the bottom left tooth.  It has been throbbing over the past 2 days.  Has been taking ibuprofen which has not worked until today.  He took 800 mg today before he came in and now he is pain-free.  He is concerned that he may have an infection.  Had concerns about a swollen lymph node in his neck.  He denies any fevers, chills, nausea, vomiting or diarrhea, constipation, chest pain, shortness of breath, difficulty swallowing, pharyngitis    ASSESSMENT / PLAN  (MEDICAL DECISION MAKING)     INITIAL VITALS: BP: 134/88, Temp: 98.2 °F (36.8 °C), Pulse: 89, Respirations: 18, SpO2: 97 %    Audi Cohen is a 59 y.o. male who presents emergency department left-sided complaints are many differential diagnoses were considered at time presentation for the patient does not have any physical exam findings of deep space infection of the neck.  He does not any signs of trismus or Jayme angina.  He does appear to have an irritated tooth with an irritated gumline.  He does not have any palpable abscess that requires drainage.  He is not currently in pain and declines dental block.  Patient was placed on Augmentin and given Peridex and instructed to follow-up with a dentist.  He was given a list of dental clinics that he can follow-up with.  He is agreed to return if symptoms change or worsen.  He did not have a palpable lymph node on physical exam    Is this patient to be included in the SEP-1 core measure? No Exclusion criteria - the patient is

## 2025-06-22 NOTE — DISCHARGE INSTRUCTIONS
If you have Medicaid Insurance:   Your health plan can help you make a next day or same day dental appointment.  The cost of this appointment is covered by your regular health plan benefits!  Please call the below number for your health plan during regular business hours to make a dental appointment.      [facility]  [facility]   [phone]  [facility]      [phone]  [facility]   [phone]  [facility]     [phone]  [place]     [phone]    If you have trouble getting services through your Medicaid provider, please feel free to call the Medicaid Hotline at [phone].        [place] [facility]  Resources:     [facility]  [address] [place] [address]  [phone]   Hours are M-Th 7:30a-5p; F 7:30-a-1:30p  Emergency walk-ins accepted every weekday 7 am (be there early)  Residency: Resident of [facility]  Must be a resident of the [facility]: Bring proof of this residence (example: utility bill);   Sliding Fee Scale  *Scale requires proof of income (example: pay stub or tax return). Scale is based on family size and income. Discounts can be 25%, 50%, 75%, or 100% of all services.   Minimum Co-pay must be $20  Medicaid, Insurance, Self-Pay    [facility]  [address], [place] [address]  [phone]   Hours are M-Th 7:30a-5p; F 7:30-a-1:30p  Emergency walk-ins accepted every weekday 7 am (be there early)  Residency: Resident of [facility]  Must be a resident of the [facility]: Bring proof of this residence (example: utility bill);   Sliding Fee Scale  *Scale requires proof of income (example: pay stub or tax return). Scale is based on family size and income. Discounts can be 25%, 50%, 75%, or 100% of all services.   Minimum Co-pay must be $20  Medicaid, Insurance, Self-Pay    [facility]  [address], [place] [id number] [phone]  Hours are M-Th 7:30a-5p; F

## 2025-07-15 DIAGNOSIS — F43.10 POSTTRAUMATIC STRESS DISORDER: ICD-10-CM

## 2025-07-15 RX ORDER — PRAZOSIN HYDROCHLORIDE 1 MG/1
CAPSULE ORAL
Qty: 270 CAPSULE | Refills: 0 | Status: SHIPPED | OUTPATIENT
Start: 2025-07-15

## 2025-07-15 RX ORDER — BUPROPION HYDROCHLORIDE 300 MG/1
300 TABLET ORAL EVERY MORNING
Qty: 30 TABLET | Refills: 0 | Status: SHIPPED | OUTPATIENT
Start: 2025-07-15

## 2025-07-15 NOTE — TELEPHONE ENCOUNTER
Medication:   Requested Prescriptions     Pending Prescriptions Disp Refills    prazosin (MINIPRESS) 1 MG capsule 270 capsule 1     Sig: TAKE 3 CAPSULE BY MOUTH EVERY NIGHT        Last Filled:      Patient Phone Number: [phone] (home)     Last appt: 8/26/2024   Next appt:    Last OARRS:        No data to display

## 2025-07-15 NOTE — TELEPHONE ENCOUNTER
Medication:   Requested Prescriptions      No prescriptions requested or ordered in this encounter        Last Filled:      Patient Phone Number: [phone] (home)     Last appt: 8/26/2024   Next appt: Visit date not found    Last OARRS:        No data to display

## 2025-07-15 NOTE — TELEPHONE ENCOUNTER
buPROPion (WELLBUTRIN XL) 300 MG extended release tablet [[id number]]  DISCONTINUED    Order Details  Dose: 300 mg Route: Oral Frequency: EVERY MORNING   Dispense Quantity: 90 tablet Refills: 0          Sig: TAKE 1 TABLET BY MOUTH EVERY MORNING           [id number] - [place] [id number]  [address] - P [phone] - F [phone]       8/26/24 12/13/23

## 2025-08-08 ENCOUNTER — TELEPHONE (OUTPATIENT)
Dept: FAMILY MEDICINE CLINIC | Age: 60
End: 2025-08-08

## 2025-08-11 RX ORDER — BUPROPION HYDROCHLORIDE 300 MG/1
300 TABLET ORAL EVERY MORNING
Qty: 30 TABLET | Refills: 0 | Status: SHIPPED | OUTPATIENT
Start: 2025-08-11

## 2025-08-25 RX ORDER — SERTRALINE HYDROCHLORIDE 100 MG/1
200 TABLET, FILM COATED ORAL DAILY
Qty: 60 TABLET | Refills: 0 | Status: SHIPPED | OUTPATIENT
Start: 2025-08-25